# Patient Record
Sex: FEMALE | Race: WHITE | Employment: FULL TIME | URBAN - METROPOLITAN AREA
[De-identification: names, ages, dates, MRNs, and addresses within clinical notes are randomized per-mention and may not be internally consistent; named-entity substitution may affect disease eponyms.]

---

## 2023-03-13 LAB
ALANINE AMINOTRANSFERASE (SGPT) (U/L) IN SER/PLAS: 13 U/L (ref 7–45)
ALBUMIN (G/DL) IN SER/PLAS: 4.6 G/DL (ref 3.4–5)
ALKALINE PHOSPHATASE (U/L) IN SER/PLAS: 46 U/L (ref 33–110)
ANION GAP IN SER/PLAS: 12 MMOL/L (ref 10–20)
ASPARTATE AMINOTRANSFERASE (SGOT) (U/L) IN SER/PLAS: 15 U/L (ref 9–39)
BASOPHILS (10*3/UL) IN BLOOD BY AUTOMATED COUNT: 0.05 X10E9/L (ref 0–0.1)
BASOPHILS/100 LEUKOCYTES IN BLOOD BY AUTOMATED COUNT: 0.6 % (ref 0–2)
BILIRUBIN TOTAL (MG/DL) IN SER/PLAS: 0.8 MG/DL (ref 0–1.2)
C REACTIVE PROTEIN (MG/L) IN SER/PLAS: <0.1 MG/DL
CALCIUM (MG/DL) IN SER/PLAS: 9.7 MG/DL (ref 8.6–10.6)
CARBON DIOXIDE, TOTAL (MMOL/L) IN SER/PLAS: 28 MMOL/L (ref 21–32)
CHLORIDE (MMOL/L) IN SER/PLAS: 103 MMOL/L (ref 98–107)
CREATININE (MG/DL) IN SER/PLAS: 0.65 MG/DL (ref 0.5–1.05)
EOSINOPHILS (10*3/UL) IN BLOOD BY AUTOMATED COUNT: 0.09 X10E9/L (ref 0–0.7)
EOSINOPHILS/100 LEUKOCYTES IN BLOOD BY AUTOMATED COUNT: 1.1 % (ref 0–6)
ERYTHROCYTE DISTRIBUTION WIDTH (RATIO) BY AUTOMATED COUNT: 12.6 % (ref 11.5–14.5)
ERYTHROCYTE MEAN CORPUSCULAR HEMOGLOBIN CONCENTRATION (G/DL) BY AUTOMATED: 32.5 G/DL (ref 32–36)
ERYTHROCYTE MEAN CORPUSCULAR VOLUME (FL) BY AUTOMATED COUNT: 86 FL (ref 80–100)
ERYTHROCYTES (10*6/UL) IN BLOOD BY AUTOMATED COUNT: 4.85 X10E12/L (ref 4–5.2)
GFR FEMALE: >90 ML/MIN/1.73M2
GLUCOSE (MG/DL) IN SER/PLAS: 85 MG/DL (ref 74–99)
HEMATOCRIT (%) IN BLOOD BY AUTOMATED COUNT: 41.9 % (ref 36–46)
HEMOGLOBIN (G/DL) IN BLOOD: 13.6 G/DL (ref 12–16)
IMMATURE GRANULOCYTES/100 LEUKOCYTES IN BLOOD BY AUTOMATED COUNT: 0.2 % (ref 0–0.9)
LEUKOCYTES (10*3/UL) IN BLOOD BY AUTOMATED COUNT: 8 X10E9/L (ref 4.4–11.3)
LYMPHOCYTES (10*3/UL) IN BLOOD BY AUTOMATED COUNT: 2.54 X10E9/L (ref 1.2–4.8)
LYMPHOCYTES/100 LEUKOCYTES IN BLOOD BY AUTOMATED COUNT: 31.6 % (ref 13–44)
MONOCYTES (10*3/UL) IN BLOOD BY AUTOMATED COUNT: 0.79 X10E9/L (ref 0.1–1)
MONOCYTES/100 LEUKOCYTES IN BLOOD BY AUTOMATED COUNT: 9.8 % (ref 2–10)
NEUTROPHILS (10*3/UL) IN BLOOD BY AUTOMATED COUNT: 4.55 X10E9/L (ref 1.2–7.7)
NEUTROPHILS/100 LEUKOCYTES IN BLOOD BY AUTOMATED COUNT: 56.7 % (ref 40–80)
NRBC (PER 100 WBCS) BY AUTOMATED COUNT: 0 /100 WBC (ref 0–0)
PLATELETS (10*3/UL) IN BLOOD AUTOMATED COUNT: 293 X10E9/L (ref 150–450)
POTASSIUM (MMOL/L) IN SER/PLAS: 4.2 MMOL/L (ref 3.5–5.3)
PROTEIN TOTAL: 7.3 G/DL (ref 6.4–8.2)
SODIUM (MMOL/L) IN SER/PLAS: 139 MMOL/L (ref 136–145)
THYROTROPIN (MIU/L) IN SER/PLAS BY DETECTION LIMIT <= 0.05 MIU/L: 2.19 MIU/L (ref 0.44–3.98)
UREA NITROGEN (MG/DL) IN SER/PLAS: 11 MG/DL (ref 6–23)

## 2023-04-10 LAB
CALPROTECTIN, STOOL: 20 UG/G
HELICOBACTER PYLORI AG: NEGATIVE

## 2023-04-26 ENCOUNTER — APPOINTMENT (OUTPATIENT)
Dept: PRIMARY CARE | Facility: CLINIC | Age: 28
End: 2023-04-26
Payer: COMMERCIAL

## 2023-07-19 PROBLEM — D84.9 IMMUNOSUPPRESSED STATUS (MULTI): Status: ACTIVE | Noted: 2023-07-19

## 2023-07-19 RX ORDER — OMEPRAZOLE 40 MG/1
1 CAPSULE, DELAYED RELEASE ORAL DAILY
COMMUNITY
Start: 2022-08-24 | End: 2023-07-20 | Stop reason: WASHOUT

## 2023-07-20 ENCOUNTER — LAB (OUTPATIENT)
Dept: LAB | Facility: LAB | Age: 28
End: 2023-07-20
Payer: COMMERCIAL

## 2023-07-20 ENCOUNTER — OFFICE VISIT (OUTPATIENT)
Dept: PRIMARY CARE | Facility: CLINIC | Age: 28
End: 2023-07-20
Payer: COMMERCIAL

## 2023-07-20 VITALS
BODY MASS INDEX: 23.69 KG/M2 | HEART RATE: 82 BPM | DIASTOLIC BLOOD PRESSURE: 75 MMHG | SYSTOLIC BLOOD PRESSURE: 111 MMHG | WEIGHT: 129.5 LBS | TEMPERATURE: 98.4 F

## 2023-07-20 DIAGNOSIS — K50.919 CROHN'S DISEASE WITH COMPLICATION, UNSPECIFIED GASTROINTESTINAL TRACT LOCATION (MULTI): Primary | ICD-10-CM

## 2023-07-20 DIAGNOSIS — N92.0 MENORRHAGIA WITH REGULAR CYCLE: ICD-10-CM

## 2023-07-20 DIAGNOSIS — Z00.00 ENCOUNTER FOR ROUTINE ADULT HEALTH EXAMINATION WITHOUT ABNORMAL FINDINGS: ICD-10-CM

## 2023-07-20 DIAGNOSIS — M54.50 CHRONIC BILATERAL LOW BACK PAIN WITHOUT SCIATICA: ICD-10-CM

## 2023-07-20 DIAGNOSIS — Z12.83 SKIN EXAM FOR MALIGNANT NEOPLASM: ICD-10-CM

## 2023-07-20 DIAGNOSIS — G89.29 CHRONIC BILATERAL LOW BACK PAIN WITHOUT SCIATICA: ICD-10-CM

## 2023-07-20 DIAGNOSIS — D84.9 IMMUNOSUPPRESSED STATUS (MULTI): ICD-10-CM

## 2023-07-20 LAB
ALANINE AMINOTRANSFERASE (SGPT) (U/L) IN SER/PLAS: 24 U/L (ref 7–45)
ALBUMIN (G/DL) IN SER/PLAS: 4.4 G/DL (ref 3.4–5)
ALKALINE PHOSPHATASE (U/L) IN SER/PLAS: 49 U/L (ref 33–110)
ANION GAP IN SER/PLAS: 8 MMOL/L (ref 10–20)
ASPARTATE AMINOTRANSFERASE (SGOT) (U/L) IN SER/PLAS: 18 U/L (ref 9–39)
BASOPHILS (10*3/UL) IN BLOOD BY AUTOMATED COUNT: 0.05 X10E9/L (ref 0–0.1)
BASOPHILS/100 LEUKOCYTES IN BLOOD BY AUTOMATED COUNT: 0.6 % (ref 0–2)
BILIRUBIN TOTAL (MG/DL) IN SER/PLAS: 0.5 MG/DL (ref 0–1.2)
C REACTIVE PROTEIN (MG/L) IN SER/PLAS: <0.1 MG/DL
CALCIDIOL (25 OH VITAMIN D3) (NG/ML) IN SER/PLAS: 24 NG/ML
CALCIUM (MG/DL) IN SER/PLAS: 9.7 MG/DL (ref 8.6–10.6)
CARBON DIOXIDE, TOTAL (MMOL/L) IN SER/PLAS: 31 MMOL/L (ref 21–32)
CHLORIDE (MMOL/L) IN SER/PLAS: 104 MMOL/L (ref 98–107)
CHOLESTEROL (MG/DL) IN SER/PLAS: 141 MG/DL (ref 0–199)
CHOLESTEROL IN HDL (MG/DL) IN SER/PLAS: 73.8 MG/DL
CHOLESTEROL/HDL RATIO: 1.9
CREATININE (MG/DL) IN SER/PLAS: 0.59 MG/DL (ref 0.5–1.05)
EOSINOPHILS (10*3/UL) IN BLOOD BY AUTOMATED COUNT: 0.16 X10E9/L (ref 0–0.7)
EOSINOPHILS/100 LEUKOCYTES IN BLOOD BY AUTOMATED COUNT: 1.9 % (ref 0–6)
ERYTHROCYTE DISTRIBUTION WIDTH (RATIO) BY AUTOMATED COUNT: 12.7 % (ref 11.5–14.5)
ERYTHROCYTE MEAN CORPUSCULAR HEMOGLOBIN CONCENTRATION (G/DL) BY AUTOMATED: 32.3 G/DL (ref 32–36)
ERYTHROCYTE MEAN CORPUSCULAR VOLUME (FL) BY AUTOMATED COUNT: 88 FL (ref 80–100)
ERYTHROCYTES (10*6/UL) IN BLOOD BY AUTOMATED COUNT: 4.59 X10E12/L (ref 4–5.2)
ESTIMATED AVERAGE GLUCOSE FOR HBA1C: 88 MG/DL
FERRITIN (UG/LL) IN SER/PLAS: 18 UG/L (ref 8–150)
GFR FEMALE: >90 ML/MIN/1.73M2
GLUCOSE (MG/DL) IN SER/PLAS: 89 MG/DL (ref 74–99)
HEMATOCRIT (%) IN BLOOD BY AUTOMATED COUNT: 40.2 % (ref 36–46)
HEMOGLOBIN (G/DL) IN BLOOD: 13 G/DL (ref 12–16)
HEMOGLOBIN A1C/HEMOGLOBIN TOTAL IN BLOOD: 4.7 %
IMMATURE GRANULOCYTES/100 LEUKOCYTES IN BLOOD BY AUTOMATED COUNT: 0.1 % (ref 0–0.9)
IRON (UG/DL) IN SER/PLAS: 102 UG/DL (ref 35–150)
IRON BINDING CAPACITY (UG/DL) IN SER/PLAS: 409 UG/DL (ref 240–445)
IRON SATURATION (%) IN SER/PLAS: 25 % (ref 25–45)
LDL: 53 MG/DL (ref 0–99)
LEUKOCYTES (10*3/UL) IN BLOOD BY AUTOMATED COUNT: 8.4 X10E9/L (ref 4.4–11.3)
LYMPHOCYTES (10*3/UL) IN BLOOD BY AUTOMATED COUNT: 2.35 X10E9/L (ref 1.2–4.8)
LYMPHOCYTES/100 LEUKOCYTES IN BLOOD BY AUTOMATED COUNT: 28.1 % (ref 13–44)
MONOCYTES (10*3/UL) IN BLOOD BY AUTOMATED COUNT: 0.91 X10E9/L (ref 0.1–1)
MONOCYTES/100 LEUKOCYTES IN BLOOD BY AUTOMATED COUNT: 10.9 % (ref 2–10)
NEUTROPHILS (10*3/UL) IN BLOOD BY AUTOMATED COUNT: 4.89 X10E9/L (ref 1.2–7.7)
NEUTROPHILS/100 LEUKOCYTES IN BLOOD BY AUTOMATED COUNT: 58.4 % (ref 40–80)
NRBC (PER 100 WBCS) BY AUTOMATED COUNT: 0 /100 WBC (ref 0–0)
PLATELETS (10*3/UL) IN BLOOD AUTOMATED COUNT: 284 X10E9/L (ref 150–450)
POTASSIUM (MMOL/L) IN SER/PLAS: 4.4 MMOL/L (ref 3.5–5.3)
PROTEIN TOTAL: 7.2 G/DL (ref 6.4–8.2)
SEDIMENTATION RATE, ERYTHROCYTE: 7 MM/H (ref 0–20)
SODIUM (MMOL/L) IN SER/PLAS: 139 MMOL/L (ref 136–145)
THYROTROPIN (MIU/L) IN SER/PLAS BY DETECTION LIMIT <= 0.05 MIU/L: 4.58 MIU/L (ref 0.44–3.98)
THYROXINE (T4) FREE (NG/DL) IN SER/PLAS: 1.01 NG/DL (ref 0.78–1.48)
TRIGLYCERIDE (MG/DL) IN SER/PLAS: 69 MG/DL (ref 0–149)
UREA NITROGEN (MG/DL) IN SER/PLAS: 12 MG/DL (ref 6–23)
VLDL: 14 MG/DL (ref 0–40)

## 2023-07-20 PROCEDURE — 82306 VITAMIN D 25 HYDROXY: CPT

## 2023-07-20 PROCEDURE — 36415 COLL VENOUS BLD VENIPUNCTURE: CPT

## 2023-07-20 PROCEDURE — 85652 RBC SED RATE AUTOMATED: CPT

## 2023-07-20 PROCEDURE — 99214 OFFICE O/P EST MOD 30 MIN: CPT | Performed by: INTERNAL MEDICINE

## 2023-07-20 PROCEDURE — 83540 ASSAY OF IRON: CPT

## 2023-07-20 PROCEDURE — 80061 LIPID PANEL: CPT

## 2023-07-20 PROCEDURE — 84443 ASSAY THYROID STIM HORMONE: CPT

## 2023-07-20 PROCEDURE — 86140 C-REACTIVE PROTEIN: CPT

## 2023-07-20 PROCEDURE — 82728 ASSAY OF FERRITIN: CPT

## 2023-07-20 PROCEDURE — 83036 HEMOGLOBIN GLYCOSYLATED A1C: CPT

## 2023-07-20 PROCEDURE — 83550 IRON BINDING TEST: CPT

## 2023-07-20 PROCEDURE — 84439 ASSAY OF FREE THYROXINE: CPT

## 2023-07-20 PROCEDURE — 1036F TOBACCO NON-USER: CPT | Performed by: INTERNAL MEDICINE

## 2023-07-20 PROCEDURE — 85025 COMPLETE CBC W/AUTO DIFF WBC: CPT

## 2023-07-20 PROCEDURE — 80053 COMPREHEN METABOLIC PANEL: CPT

## 2023-07-20 RX ORDER — ESCITALOPRAM OXALATE 10 MG/1
10 TABLET ORAL DAILY
COMMUNITY
Start: 2023-06-01 | End: 2023-10-24 | Stop reason: SDUPTHER

## 2023-07-20 NOTE — PATIENT INSTRUCTIONS
It was a pleasure to see you today! Here is a list of things we have discussed and to follow up on:   I have ordered blood and/or urine tests for you to do today. The lab can be found on this floor (2nd floor) next to the pharmacy across from the elevators.    Stop by the first floor for your x-ray.   Try low impact aerobic exercises 5-10 minutes a day, try taking pepcid prior.   Followup with me in 3 months for your physical.

## 2023-07-20 NOTE — PROGRESS NOTES
Subjective   Patient ID: Leni Valles is a 28 y.o. female who presents for NEW PT VISIT .  HPI  28-year-old female new here for establishment of care, previously seen by Dr. Gomez last seen in February of last year.    -Seen by her gastroenterologist in March concern for chronic fatigue, ordered inflammatory markers labs and H. pylori testing were unremarkable.  Fecal calprotectin also negative. She notes chronic exhaustion that she needs to continue to keep sleeping. Denies shortness of breath, chest pain, presyncopal, dizziness. Extreme fatigue gets in the way of her life started in the last 2 weeks, though she has been chronically mildly tired. No recent job changes. When at job no impairment in job functioning. Does not believe her medications are contributing to her symptoms.   - Has been experiencing some bruising on legs and armst hat is new for her, does not usually bruise easily.   - When exercising had noted epigastric discomfort was given PPI which worsened her symptoms. Has a known hiatal hernia.     PMHx:  -Crohn's disease-diagnosed at age 13 on Entyvio infusions followed by gastroenterology Dr. Toussaint last seen in March.  Last EGD and colonoscopy 7/2022  -Possible mild peripheral spondyloarthropathy - gets back pain worse with menses, pain is mild in her lower back, no radiation of pain, described as a soreness sensation, at times feel stiffness. Gets some wrist and hand pains, someitmes her knees.   - Anxiety treated with Lexapro with psychiatrist - symptoms have improved but still present. Intermittent panic symptoms, no side effects on this medication. Has been on this medication since early May. Some mild symptoms of depression   - Menorrhagia - very heavy periods for the first 3 days needs to change a super tampon ever 45 minutes.     Social:   - No tobacco, social alcohol or drugs   - Works in [employer] at main campus, works night shift. 4 nights a week x 10 hours   - Lives at home with  boyfriend, safe at home.     Lifestyle   - Sleep - 6-8 hours no interruptions, does not feel refreshed when she wakes up, does not snore.     Objective     /75   Pulse 82   Temp 36.9 °C (98.4 °F)   Wt 58.7 kg (129 lb 8 oz)   BMI 23.69 kg/m²   Bp 106/71 heart rate 74   Physical Exam  General: Appears comfortable, NAD, appropriate affect  HEENT: NCAT, EOMI, pupils symmetric, no conjunctival erythema, no abnormal nasal mucosa appreciated.   Neck: Supple, no LAD   Heart: RRR S1 S2 no murmurs appreciated   Lungs: CTA bilaterally, no rhonchi, rales, or wheezes   Abdomen: Soft, NT/ND, no rebound or guarding, NABS   Extremities: no cyanosis or edema appreciated  Neuro: AAO x 3, answers questions appropriately, no FND, gait unremarkable,  Musk: no reproducible spinal tenderness, no reproducible paraspinal tenderness, demonstrates full ROM, strength intact, SLR test negative bilaterally       Assessment/Plan   Problem List Items Addressed This Visit       Crohn's disease (CMS/HCC) - Primary     On entyvio infusions, no evidence of recurrence followed by GI. Recheck inflammatory markers.          Immunosuppressed status (CMS/HCC)     Other Visit Diagnoses       Skin exam for malignant neoplasm        needs yearly derm eval    Relevant Orders    Referral to Dermatology    Encounter for routine adult health examination without abnormal findings        Relevant Orders    CBC and Auto Differential    Hemoglobin A1C    Comprehensive Metabolic Panel    Lipid Panel    TSH with reflex to Free T4 if abnormal    Vitamin D, Total    Follow Up In [facility] - PCP - Health Maintenance    Menorrhagia with regular cycle        Possible contributor to her symtpoms, will check for iron deficiency.    Relevant Orders    Iron and TIBC    Ferritin    Chronic bilateral low back pain without sciatica        No alarm features, suggestive of possible strain. Will check XR with SI joint views. consider PT referral.    Relevant  Orders    Sedimentation Rate    C-reactive protein    XR lumbar spine complete 4+ views          Health Maintenance  Cancer screening:   - Pap 1/2021  -Dermatology evaluation annually recommended    Followup 3 months

## 2023-07-24 DIAGNOSIS — E61.1 IRON DEFICIENCY: Primary | ICD-10-CM

## 2023-07-24 DIAGNOSIS — E03.8 SUBCLINICAL HYPOTHYROIDISM: ICD-10-CM

## 2023-09-26 PROBLEM — R19.7 DIARRHEA: Status: ACTIVE | Noted: 2023-09-26

## 2023-09-26 PROBLEM — R10.33 PERIUMBILICAL ABDOMINAL PAIN: Status: ACTIVE | Noted: 2023-09-26

## 2023-09-26 PROBLEM — G93.32 CHRONIC FATIGUE DISORDER: Status: ACTIVE | Noted: 2023-09-26

## 2023-09-26 RX ORDER — VIT C/E/ZN/COPPR/LUTEIN/ZEAXAN 250MG-90MG
3000 CAPSULE ORAL DAILY
COMMUNITY
Start: 2012-08-27 | End: 2024-04-25 | Stop reason: WASHOUT

## 2023-09-26 RX ORDER — MESALAMINE 500 MG/1
1000 CAPSULE, EXTENDED RELEASE ORAL 4 TIMES DAILY
COMMUNITY
Start: 2015-03-16 | End: 2024-04-25 | Stop reason: WASHOUT

## 2023-09-26 RX ORDER — TRETINOIN 0.5 MG/G
1 CREAM TOPICAL EVERY EVENING
COMMUNITY
Start: 2012-03-10 | End: 2024-04-25 | Stop reason: WASHOUT

## 2023-09-26 RX ORDER — OMEPRAZOLE 40 MG/1
40 CAPSULE, DELAYED RELEASE ORAL DAILY
COMMUNITY
End: 2024-04-25 | Stop reason: WASHOUT

## 2023-09-26 RX ORDER — METHOCARBAMOL 500 MG/1
1-2 TABLET, FILM COATED ORAL EVERY 12 HOURS PRN
COMMUNITY
Start: 2023-07-25 | End: 2024-04-25 | Stop reason: WASHOUT

## 2023-09-26 RX ORDER — FOLIC ACID 1 MG/1
1 TABLET ORAL DAILY
COMMUNITY
Start: 2016-11-25 | End: 2024-04-25 | Stop reason: WASHOUT

## 2023-09-26 RX ORDER — ONDANSETRON 4 MG/1
4 TABLET, ORALLY DISINTEGRATING ORAL AS NEEDED
COMMUNITY
Start: 2023-05-25 | End: 2024-04-25 | Stop reason: WASHOUT

## 2023-09-26 RX ORDER — CALCIUM CITRATE/VITAMIN D3 315MG-6.25
1 TABLET ORAL 2 TIMES DAILY
COMMUNITY
End: 2024-04-25 | Stop reason: WASHOUT

## 2023-09-26 RX ORDER — NAPROXEN 500 MG/1
500 TABLET ORAL EVERY 12 HOURS PRN
COMMUNITY
Start: 2023-07-25

## 2023-09-26 RX ORDER — MERCAPTOPURINE 50 MG/1
100 TABLET ORAL DAILY
COMMUNITY
Start: 2017-01-10 | End: 2024-04-25 | Stop reason: WASHOUT

## 2023-10-02 ENCOUNTER — OFFICE VISIT (OUTPATIENT)
Dept: GASTROENTEROLOGY | Facility: HOSPITAL | Age: 28
End: 2023-10-02
Payer: COMMERCIAL

## 2023-10-02 ENCOUNTER — LAB (OUTPATIENT)
Dept: LAB | Facility: LAB | Age: 28
End: 2023-10-02
Payer: COMMERCIAL

## 2023-10-02 VITALS
DIASTOLIC BLOOD PRESSURE: 85 MMHG | RESPIRATION RATE: 18 BRPM | HEART RATE: 70 BPM | SYSTOLIC BLOOD PRESSURE: 125 MMHG | TEMPERATURE: 98.1 F | WEIGHT: 131 LBS | BODY MASS INDEX: 24.11 KG/M2 | HEIGHT: 62 IN

## 2023-10-02 DIAGNOSIS — K50.019 CROHN'S DISEASE OF SMALL INTESTINE WITH COMPLICATION (MULTI): Primary | ICD-10-CM

## 2023-10-02 DIAGNOSIS — K50.019 CROHN'S DISEASE OF SMALL INTESTINE WITH COMPLICATION (MULTI): ICD-10-CM

## 2023-10-02 LAB
ALBUMIN SERPL BCP-MCNC: 4.6 G/DL (ref 3.4–5)
ALP SERPL-CCNC: 48 U/L (ref 33–110)
ALT SERPL W P-5'-P-CCNC: 12 U/L (ref 7–45)
ANION GAP SERPL CALC-SCNC: 10 MMOL/L (ref 10–20)
AST SERPL W P-5'-P-CCNC: 15 U/L (ref 9–39)
BASOPHILS # BLD AUTO: 0.03 X10*3/UL (ref 0–0.1)
BASOPHILS NFR BLD AUTO: 0.4 %
BILIRUB SERPL-MCNC: 0.8 MG/DL (ref 0–1.2)
BUN SERPL-MCNC: 10 MG/DL (ref 6–23)
CALCIUM SERPL-MCNC: 9.5 MG/DL (ref 8.6–10.6)
CHLORIDE SERPL-SCNC: 102 MMOL/L (ref 98–107)
CO2 SERPL-SCNC: 31 MMOL/L (ref 21–32)
CREAT SERPL-MCNC: 0.64 MG/DL (ref 0.5–1.05)
CRP SERPL-MCNC: <0.1 MG/DL
EOSINOPHIL # BLD AUTO: 0.09 X10*3/UL (ref 0–0.7)
EOSINOPHIL NFR BLD AUTO: 1.3 %
ERYTHROCYTE [DISTWIDTH] IN BLOOD BY AUTOMATED COUNT: 12.4 % (ref 11.5–14.5)
GFR SERPL CREATININE-BSD FRML MDRD: >90 ML/MIN/1.73M*2
GLUCOSE SERPL-MCNC: 79 MG/DL (ref 74–99)
HCT VFR BLD AUTO: 39.4 % (ref 36–46)
HGB BLD-MCNC: 12.9 G/DL (ref 12–16)
IMM GRANULOCYTES # BLD AUTO: 0.02 X10*3/UL (ref 0–0.7)
IMM GRANULOCYTES NFR BLD AUTO: 0.3 % (ref 0–0.9)
LYMPHOCYTES # BLD AUTO: 2.03 X10*3/UL (ref 1.2–4.8)
LYMPHOCYTES NFR BLD AUTO: 30.3 %
MCH RBC QN AUTO: 28 PG (ref 26–34)
MCHC RBC AUTO-ENTMCNC: 32.7 G/DL (ref 32–36)
MCV RBC AUTO: 86 FL (ref 80–100)
MONOCYTES # BLD AUTO: 0.65 X10*3/UL (ref 0.1–1)
MONOCYTES NFR BLD AUTO: 9.7 %
NEUTROPHILS # BLD AUTO: 3.88 X10*3/UL (ref 1.2–7.7)
NEUTROPHILS NFR BLD AUTO: 58 %
NRBC BLD-RTO: 0 /100 WBCS (ref 0–0)
PLATELET # BLD AUTO: 256 X10*3/UL (ref 150–450)
PMV BLD AUTO: 10.7 FL (ref 7.5–11.5)
POTASSIUM SERPL-SCNC: 4.2 MMOL/L (ref 3.5–5.3)
PROT SERPL-MCNC: 7.3 G/DL (ref 6.4–8.2)
RBC # BLD AUTO: 4.6 X10*6/UL (ref 4–5.2)
SODIUM SERPL-SCNC: 139 MMOL/L (ref 136–145)
WBC # BLD AUTO: 6.7 X10*3/UL (ref 4.4–11.3)

## 2023-10-02 PROCEDURE — 99214 OFFICE O/P EST MOD 30 MIN: CPT | Performed by: INTERNAL MEDICINE

## 2023-10-02 PROCEDURE — 36415 COLL VENOUS BLD VENIPUNCTURE: CPT

## 2023-10-02 PROCEDURE — 85025 COMPLETE CBC W/AUTO DIFF WBC: CPT

## 2023-10-02 PROCEDURE — 80053 COMPREHEN METABOLIC PANEL: CPT

## 2023-10-02 PROCEDURE — 86140 C-REACTIVE PROTEIN: CPT

## 2023-10-02 PROCEDURE — 1036F TOBACCO NON-USER: CPT | Performed by: INTERNAL MEDICINE

## 2023-10-02 RX ORDER — DICYCLOMINE HYDROCHLORIDE 10 MG/1
10 CAPSULE ORAL 3 TIMES DAILY PRN
Qty: 30 CAPSULE | Refills: 1 | Status: SHIPPED | OUTPATIENT
Start: 2023-10-02 | End: 2024-10-01

## 2023-10-02 RX ORDER — BUDESONIDE 3 MG/1
9 CAPSULE, COATED PELLETS ORAL DAILY
Qty: 90 CAPSULE | Refills: 1 | Status: SHIPPED | OUTPATIENT
Start: 2023-10-02 | End: 2024-04-25 | Stop reason: WASHOUT

## 2023-10-02 ASSESSMENT — LIFESTYLE VARIABLES
AUDIT-C TOTAL SCORE: 1
HOW MANY STANDARD DRINKS CONTAINING ALCOHOL DO YOU HAVE ON A TYPICAL DAY: 1 OR 2
HOW OFTEN DO YOU HAVE SIX OR MORE DRINKS ON ONE OCCASION: NEVER
SKIP TO QUESTIONS 9-10: 1
HOW OFTEN DO YOU HAVE A DRINK CONTAINING ALCOHOL: MONTHLY OR LESS

## 2023-10-02 ASSESSMENT — PAIN SCALES - GENERAL: PAINLEVEL: 3

## 2023-10-02 NOTE — PATIENT INSTRUCTIONS
Update labs: CBC, CMP, CRP, and fecal calprotectin.  Resume Entyvio as soon as possible.  Prescribe budesonide 9 mg daily.  Prescribe dicyclomine to take as needed for abdominal pain.  Follow-up in 3 months.

## 2023-10-02 NOTE — PROGRESS NOTES
Subjective     History of Present Illness:   Leni Valles is a 28 y.o. female who presents to GI clinic for Crohn's disease s/p ileocolectomy in 2012 who presented to GI clinic for follow-up.    Symptoms: She reports lower right abdominal pain, epigastric pain and back pain since Jul 2023, progressively worse in the last couple weeks, hurting her daily. Pain is worse with eating associated with bloating and nauseous.   Her appetite has been poor in the past month. No weight loss reported.   Her baseline is 1-2 formed to loose stools, now has 2-3 loose Bms, some nocturnal stools w/ urgency. Usually they are non-bloody, except for yesterday.     She is currently on Entyvio Q8 weeks, last infusion was in end of June 2023.   She missed an infusion in Aug 2023.  She has an upcoming appt with the [facility] on 10/24 for her Entyvio infusion.    IBD History  Patient was diagnosed with Crohn's disease at age 13. Her symptoms at the time were abdominal pain and constipation. She underwent EGD and colonoscopy in 2009 with Dr. Oconnell at [facility] which reportedly showed ileitis. Patient was initially placed on Pentasa. She later switched her care to Dr. Bansal at [facility] at age 15 and continued on Pentasa. 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 She underwent ileocolonic resection in February 2012 and reportedly had one foot of ileum resected. She resumed Pentasa and added mercaptopurine 75 mg daily post surgery.   She transitioned to adult GI at [facility] with Dr. Gee in 2020 and continued mercaptopurine and Pentasa. Patient had colonoscopy in February 2021 which reportedly showed minimal ileal erosions. Pathology showed patchy ileitis and normal colonic biopsies. MRE 8/18/21 showed no evidence of active CD. She experienced symptoms of weight loss, intermittent abdominal pain, and diarrhea in 2020. In November 2021, patient was placed on vedolizumab infusions.  Pentasa & mercaptopurine were discontinued in March 2022.   Patient established care with our clinic in May 2022. She complained of having diarrhea and abdominal pain with food intake. She underwent diagnostic EGD and colonoscopy on 7/8/22 which showed 1 cm hiatal hernia, normal hafsa-TI, and colon. Capsule endoscopy on 8/22/22 showed normal small bowel.     Review of Systems  Review of Systems  Constitutional: denies fever, chills, weight loss  HEENT: denies oral ulcers, dysphagia  Cardiovascular: denies chest pain  Respiratory: denies shortness of breath  GI: see HPI  Musculoskeletal: + back pain  Hem/Onc: denies easy bleeding or bruising  Dermatology: denies jaundice, rash      Past Medical History   has a past medical history of Other conditions influencing health status.   Crohn's disease    Social History   reports that she has never smoked. She has never used smokeless tobacco. She reports that she does not currently use alcohol after a past usage of about 1.0 standard drink of alcohol per week. She reports that she does not use drugs.     Family History  family history includes Breast cancer (age of onset: 40) in her mother; Hypertension in her father; Leukemia in her maternal grandfather; Lung cancer in her maternal grandmother; Ovarian cancer in her mother; Skin cancer in her father; Uterine cancer (age of onset: 58) in her mother.     Allergies  Allergies   Allergen Reactions    Lactose Unknown       Medications  Current Outpatient Medications   Medication Instructions    CALCIUM CITRATE ORAL oral, Daily, 2 gummies    calcium citrate-vitamin D3 (Citracal + D Maximum) 315 mg-6.25 mcg (250 unit) tablet 1 tablet, oral, 2 times daily, For 30 days    cholecalciferol (VITAMIN D-3) 3,000 Units, oral, Daily    escitalopram (LEXAPRO) 10 mg, oral, Daily    folic acid (FOLVITE) 1 mg, oral, Daily, For 30 days    LACTASE-RENNET ORAL 1 tablet, oral, 3 times daily with meals, lactase-rennet 25-2 mg ORAL tablet     mercaptopurine (PURINETHOL) 100 mg, oral, Daily    mesalamine (PENTASA) 1,000 mg, oral, 4 times daily, For 30 days    methocarbamol (Robaxin) 500 mg tablet 1-2 tablets, oral, Every 12 hours PRN, May cause sleepiness    multivitamin capsule 1 capsule, oral, Daily    naproxen (NAPROSYN) 500 mg, oral, Every 12 hours PRN    omeprazole (PRILOSEC) 40 mg, oral, Daily, Delayed Release capsule - Do not crush or chew.    ondansetron ODT (ZOFRAN-ODT) 4 mg, oral, As needed, 1-2x daily    tretinoin (Retin-A) 0.05 % cream 1 Application, Topical, Every evening, To face    vedolizumab (Entyvio) 300 mg injection intravenous    ZINC GLUCONATE ORAL 1 each, oral, Daily        Objective   Visit Vitals  /85   Pulse 70   Temp 36.7 °C (98.1 °F)   Resp 18      Physical Exam  Physical Exam  Vitals signs reviewed.    General: not in acute distress  CV: regular rate and rhythm, no murmur  Resp: clear to auscultation bilaterally  GI: soft, active bowel sounds, + epigastric tenderness to palpation, no rebound or guarding, no ascites  Msk: no lower extremity edema  Derm: no jaundice     Labs  From 07/20/2023    CBC: Hb 13.0, otherwise normal  ESR 7  CRP < 0.10  Ferritin 18  Iron 102, TIBC 409, saturation 25%  CMP normal      Assessment/Plan   Ms. Valles is a pleasant 28 year-old female with Crohn's disease s/p ileocolectomy in 2012 who presented to GI clinic for follow-up. Patient reports having abdominal pain, decrease appetite and bloating x 3 months in the setting of missing her last Entyvio Aug 2023.  These symptoms may be due to Crohn's disease flare.     Crohn's disease:  Age at diagnosis: 13   Disease Extent: ileal disease  Disease Behavior: stricturing  Current symptoms: mild epigastric pain with food intake that may be due to dyspepsia/PUD  Prior Medications: 6-MP 75 mg daily, Pentasa  Active Medications: vedolizumab every 8 weeks  Last endoscopy: EGD and colonoscopy on 7/8/22 showed 1 cm hiatal hernia, normal hafsa-TI, and colon.  Capsule endoscopy on 8/22/22 showed normal small bowel  Last imaging studies: MRE on 8/18/21 showed no evidence of active CD  EIMs: possible mild peripheral spondyloarthropathy in wrist and knees.     Plan   Patient should resume Entyvio as soon as possible, currently scheduled for 10/24.   Update labs: CBC, CMP, CRP, and fecal calprotectin.  Prescribe budesonide 9 mg daily for a month  Dicyclomine as needed for abdominal pain  If her symptoms do not improve within a week of resuming Entyvio, she will call our office.   Follow-up in 3 months      Marlen Giron MD       I have examined the patient and reviewed the trainee's note.  I agree with the following documentation with edits to reflect my assessment.  Additional comments below as needed.    Gordon Toussaint MD

## 2023-10-10 ENCOUNTER — LAB (OUTPATIENT)
Dept: LAB | Facility: LAB | Age: 28
End: 2023-10-10
Payer: COMMERCIAL

## 2023-10-10 DIAGNOSIS — E03.8 SUBCLINICAL HYPOTHYROIDISM: ICD-10-CM

## 2023-10-10 DIAGNOSIS — E61.1 IRON DEFICIENCY: ICD-10-CM

## 2023-10-10 LAB — TSH SERPL-ACNC: 3.77 MIU/L (ref 0.44–3.98)

## 2023-10-10 PROCEDURE — 84443 ASSAY THYROID STIM HORMONE: CPT

## 2023-10-10 PROCEDURE — 36415 COLL VENOUS BLD VENIPUNCTURE: CPT

## 2023-10-17 DIAGNOSIS — K50.019 CROHN'S DISEASE OF SMALL INTESTINE WITH COMPLICATION (MULTI): Primary | ICD-10-CM

## 2023-10-17 RX ORDER — DIPHENHYDRAMINE HYDROCHLORIDE 50 MG/ML
50 INJECTION INTRAMUSCULAR; INTRAVENOUS AS NEEDED
Status: CANCELLED | OUTPATIENT
Start: 2023-10-24

## 2023-10-17 RX ORDER — EPINEPHRINE 0.3 MG/.3ML
0.3 INJECTION SUBCUTANEOUS EVERY 5 MIN PRN
Status: CANCELLED | OUTPATIENT
Start: 2023-10-24

## 2023-10-17 RX ORDER — ALBUTEROL SULFATE 0.83 MG/ML
3 SOLUTION RESPIRATORY (INHALATION) AS NEEDED
Status: CANCELLED | OUTPATIENT
Start: 2023-10-24

## 2023-10-17 RX ORDER — FAMOTIDINE 10 MG/ML
20 INJECTION INTRAVENOUS ONCE AS NEEDED
Status: CANCELLED | OUTPATIENT
Start: 2023-10-24

## 2023-10-24 ENCOUNTER — OFFICE VISIT (OUTPATIENT)
Dept: PRIMARY CARE | Facility: CLINIC | Age: 28
End: 2023-10-24
Payer: COMMERCIAL

## 2023-10-24 ENCOUNTER — INFUSION (OUTPATIENT)
Dept: INFUSION THERAPY | Facility: CLINIC | Age: 28
End: 2023-10-24
Payer: COMMERCIAL

## 2023-10-24 VITALS
SYSTOLIC BLOOD PRESSURE: 110 MMHG | TEMPERATURE: 97.5 F | HEART RATE: 83 BPM | OXYGEN SATURATION: 97 % | DIASTOLIC BLOOD PRESSURE: 74 MMHG | BODY MASS INDEX: 23.78 KG/M2 | WEIGHT: 130 LBS

## 2023-10-24 VITALS
BODY MASS INDEX: 24.03 KG/M2 | WEIGHT: 131.39 LBS | OXYGEN SATURATION: 99 % | HEART RATE: 60 BPM | SYSTOLIC BLOOD PRESSURE: 105 MMHG | RESPIRATION RATE: 16 BRPM | TEMPERATURE: 98.5 F | DIASTOLIC BLOOD PRESSURE: 72 MMHG

## 2023-10-24 DIAGNOSIS — D84.9 IMMUNOSUPPRESSED STATUS (MULTI): ICD-10-CM

## 2023-10-24 DIAGNOSIS — Z01.419 WELL WOMAN EXAM: ICD-10-CM

## 2023-10-24 DIAGNOSIS — K50.919 CROHN'S DISEASE WITH COMPLICATION, UNSPECIFIED GASTROINTESTINAL TRACT LOCATION (MULTI): ICD-10-CM

## 2023-10-24 DIAGNOSIS — F41.9 ANXIETY: ICD-10-CM

## 2023-10-24 DIAGNOSIS — K50.019 CROHN'S DISEASE OF SMALL INTESTINE WITH COMPLICATION (MULTI): ICD-10-CM

## 2023-10-24 DIAGNOSIS — G93.32 CHRONIC FATIGUE DISORDER: Primary | ICD-10-CM

## 2023-10-24 PROCEDURE — 1036F TOBACCO NON-USER: CPT | Performed by: STUDENT IN AN ORGANIZED HEALTH CARE EDUCATION/TRAINING PROGRAM

## 2023-10-24 PROCEDURE — 90471 IMMUNIZATION ADMIN: CPT | Performed by: STUDENT IN AN ORGANIZED HEALTH CARE EDUCATION/TRAINING PROGRAM

## 2023-10-24 PROCEDURE — 99213 OFFICE O/P EST LOW 20 MIN: CPT | Performed by: STUDENT IN AN ORGANIZED HEALTH CARE EDUCATION/TRAINING PROGRAM

## 2023-10-24 PROCEDURE — 90682 RIV4 VACC RECOMBINANT DNA IM: CPT | Performed by: STUDENT IN AN ORGANIZED HEALTH CARE EDUCATION/TRAINING PROGRAM

## 2023-10-24 PROCEDURE — 96365 THER/PROPH/DIAG IV INF INIT: CPT | Performed by: NURSE PRACTITIONER

## 2023-10-24 RX ORDER — EPINEPHRINE 0.3 MG/.3ML
0.3 INJECTION SUBCUTANEOUS EVERY 5 MIN PRN
Status: CANCELLED | OUTPATIENT
Start: 2023-12-19

## 2023-10-24 RX ORDER — ESCITALOPRAM OXALATE 10 MG/1
15 TABLET ORAL DAILY
Qty: 135 TABLET | Refills: 3 | Status: SHIPPED | OUTPATIENT
Start: 2023-10-24

## 2023-10-24 RX ORDER — DIPHENHYDRAMINE HYDROCHLORIDE 50 MG/ML
50 INJECTION INTRAMUSCULAR; INTRAVENOUS AS NEEDED
Status: CANCELLED | OUTPATIENT
Start: 2023-12-19

## 2023-10-24 RX ORDER — FAMOTIDINE 10 MG/ML
20 INJECTION INTRAVENOUS ONCE AS NEEDED
Status: CANCELLED | OUTPATIENT
Start: 2023-12-19

## 2023-10-24 RX ORDER — ALBUTEROL SULFATE 0.83 MG/ML
3 SOLUTION RESPIRATORY (INHALATION) AS NEEDED
Status: CANCELLED | OUTPATIENT
Start: 2023-12-19

## 2023-10-24 ASSESSMENT — ENCOUNTER SYMPTOMS
RECTAL PAIN: 0
BLOOD IN STOOL: 0
HOT FLASHES: 0
NECK PAIN: 0
VOMITING: 0
DEPRESSION: 0
BRUISES/BLEEDS EASILY: 0
SHORTNESS OF BREATH: 0
SLEEP DISTURBANCE: 0
CONFUSION: 0
FEVER: 0
DIZZINESS: 0
NUMBNESS: 0
CONSTIPATION: 0
APPETITE CHANGE: 0
SCLERAL ICTERUS: 0
ADENOPATHY: 0
NECK STIFFNESS: 0
NERVOUS/ANXIOUS: 0
COUGH: 0
HEADACHES: 0
PALPITATIONS: 0
CHILLS: 0
FATIGUE: 0
DYSURIA: 0
EXTREMITY WEAKNESS: 0
EYE PROBLEMS: 0
LIGHT-HEADEDNESS: 0
ARTHRALGIAS: 0
BACK PAIN: 0
LEG SWELLING: 0
UNEXPECTED WEIGHT CHANGE: 0
DIAPHORESIS: 0
VOICE CHANGE: 0
SEIZURES: 0
MYALGIAS: 0
WHEEZING: 0
ABDOMINAL PAIN: 0
DECREASED CONCENTRATION: 0
ABDOMINAL DISTENTION: 0
HEMATURIA: 0
TROUBLE SWALLOWING: 0
FLANK PAIN: 0
HEMOPTYSIS: 0
SORE THROAT: 0
CHEST TIGHTNESS: 0
WOUND: 0
NAUSEA: 0
DIARRHEA: 0
FREQUENCY: 0
DIFFICULTY URINATING: 0
SPEECH DIFFICULTY: 0

## 2023-10-24 NOTE — PATIENT INSTRUCTIONS
Today you received: ENTYVIO 300MG Q8 WEEKS     For:   1. Crohn's disease of small intestine with complication (CMS/[facility])          Please read the  Medication Guide that was given to you and reviewed during todays visit.     (Tell all doctors including dentists that you are taking this medication)     Go to the emergency room or call 911 if:  -You have signs of allergic reaction:   o         Rash, hives, itching.   o         Swollen, blistered, peeling skin.   o         Swelling of face, lips, mouth, tongue or throat.   o         Tightness of chest, trouble breathing, swallowing or talking      Call your doctor:     - If IV / injection site gets red, warm, swollen, itchy or leaks fluid or pus.     (Leave dressing on your IV site for at least 2 hours and keep area clean and dry  - If you get sick or have symptoms of infection or are not feeling well for any reason.    (Wash your hands often, stay away from people who are sick)  - If you have side effects from your medication that do not go away or are bothersome.     (Refer to the teaching your nurse gave you for side effects to call your doctor about)     Common side effects may include:  stuffy nose, headache, feeling tired, muscle aches, upset stomach  - Before receiving any vaccines, Call the [facility] at   if:  - You get sick, are on antibiotics, have had a recent vaccine, have surgery or dental work and your doctor wants your visit rescheduled.  - You need to cancel and reschedule your visit for any reason. Call at least 2 days before your visit if you need to cancel.   - Your insurance changes before your next visit.    (We will need to get approval from your new insurance. This can take up to two weeks.)     The [facility] is opened Monday thru Friday. We are closed on weekends and holidays.     Voice mail will take your call if the center is closed. If you leave a message please allow 24 hours for a call back during weekdays.  If you leave a message on a weekend/holiday, we will call you back the next business day.

## 2023-10-24 NOTE — PROGRESS NOTES
Subjective   Patient ID: Leni Valles is a 28 y.o. female who presents for No chief complaint on file..    HPI   Patient normally sees Dr. Sams  in the practice, in my schedule today however for scheduling reasons     Re: anxiety - was getting through psych provider, requesting SSRI through  PCP moving forward. Will renew. Well managed.    Re: IBD - on Entivyo. Infusion later today. Recent flare up presents as diarrhea but nothing severe enough for hospitalization.    Re: energy levels - remain on the low side. She has a hard time getting up and motivated and starting her day without caffeine. Feels decent rested after a night's sleep.     Re:  - requesting GYN referral for Pap smear (due next 1/24). Due for flu shot.       Review of Systems    Objective   /74   Pulse 83   Temp 36.4 °C (97.5 °F)   Wt 59 kg (130 lb)   SpO2 97%   BMI 23.78 kg/m²     Physical Exam  Gen: well developed in NAD. AAO x3.  HEENT: NC/AT. Anicteric sclera, symmetric pupils. MMM no thrush.  Neck: Soft, supple. No LAD. No goiter.   CV: RRR nl s1s2 no m/r/g  Pulm: CTAB no w/r/r, good air exchange  GI: soft NTND BS+ no hsm  Ext: WWP no edema  Neuro: II-XII grossly intact, nonfocal systemic findings  MSK: 5/5 strength b/l UE and LE  Gait: unremarkable    Lab Results   Component Value Date    WBC 6.7 10/02/2023    HGB 12.9 10/02/2023    HCT 39.4 10/02/2023     10/02/2023    CHOL 141 07/20/2023    TRIG 69 07/20/2023    HDL 73.8 07/20/2023    ALT 12 10/02/2023    AST 15 10/02/2023     10/02/2023    K 4.2 10/02/2023     10/02/2023    CREATININE 0.64 10/02/2023    BUN 10 10/02/2023    CO2 31 10/02/2023    TSH 3.77 10/10/2023    HGBA1C 4.7 07/20/2023     par     Assessment/Plan   Doing very well    # Anxiety  - renew SSRI  - follow psychologist    # Fatigue: labs reassuring  - continue current treatment  - recommend more sleep at night    # IBD: controlled  - follow up GI  - continue Entivyo    # HM  - flu shot  today (flublok)  - GYN referral  - follow up Dr. Sams

## 2023-10-24 NOTE — PROGRESS NOTES
[facility]   infusion Clinic Note   Date: 2023   Name: Leni Valles  : 1995   MRN: [id number]         Reason for Visit: OP Infusion (ENTYVIO 300MG [id number])       Visit Type: INFUSION     Ordered By: DR QUEEN   Accompanied by:Parent      Diagnosis: Crohn's disease of small intestine with complication (CMS/HCC)      Allergies:   Allergies as of 10/24/2023 - Reviewed 10/24/2023   Allergen Reaction Noted    Lactose Unknown 2017        Current Medications has a current medication list which includes the following prescription(s): budesonide ec, calcium citrate, calcium citrate-vitamin d3, cholecalciferol, dicyclomine, escitalopram, folic acid, lactase/rennet, mercaptopurine, mesalamine, methocarbamol, multivitamin, naproxen, omeprazole, ondansetron odt, tretinoin, vedolizumab, and zinc gluconate.          Vitals:  Vitals:    10/24/23 1055 10/24/23 1120 10/24/23 1150   BP: 104/66 105/73 105/72   Pulse: 66 61 60   Resp: 16 16 16   Temp: 36.7 °C (98 °F) 36.7 °C (98.1 °F) 36.9 °C (98.5 °F)   SpO2: 99% 99% 99%   Weight: 59.6 kg (131 lb 6.3 oz)            Infusion Pre-procedure Checklist:   Allergies reviewed: yes   Medications reviewed: yes     Previous reaction to current treatment: No      Assess patient for the concerns below. Document provider notification as appropriate.  - Active or recent infection with/without current antibiotic use: no  - Recent or planned invasive dental work: no  - Recent or planned surgeries: no  - Recently received or plans to receive vaccinations: no  - Has treatment related toxicities: no  - Is pregnant:  no    - Does the patient meet criteria to treat? Yes    Provider notified? Not applicable      Pain: 0    Is the pain different from normal: n/a   Is the pain tolerable: n/a   Is your Doctor aware: n/a       Labs: None      Fall Risk Screening: Susy Fall Risk  History of Falling, Immediate or Within 3 Months: No  Secondary Diagnosis:  No  Ambulatory Aid: Walks without aid/bedrest/nurse assist  Intravenous Therapy/Heparin Lock: No  Gait/Transferring: Normal/bedrest/immobile  Mental Status: Oriented to own ability  Jain Fall Risk Score: 0       Review of Systems   Constitutional:  Negative for appetite change, chills, diaphoresis, fatigue, fever and unexpected weight change.   HENT:   Negative for hearing loss, lump/mass, mouth sores, nosebleeds, sore throat, tinnitus, trouble swallowing and voice change.    Eyes:  Negative for eye problems and icterus.   Respiratory:  Negative for chest tightness, cough, hemoptysis, shortness of breath and wheezing.    Cardiovascular:  Negative for chest pain, leg swelling and palpitations.   Gastrointestinal:  Negative for abdominal distention, abdominal pain, blood in stool, constipation, diarrhea, nausea, rectal pain and vomiting.        PT WITH A DX OF IBD REPORTS 2-3 LOOSE BM'S PER DAY. ADMITS NOTING BLOOD/MUCUS TO STOOLS. ADMITS C/O OF ABDOMINAL PAIN AND NOCTURNAL STOOLING.    Endocrine: Negative for hot flashes.   Genitourinary:  Negative for bladder incontinence, difficulty urinating, dyspareunia, dysuria, frequency, hematuria, menstrual problem, nocturia, pelvic pain, vaginal bleeding and vaginal discharge.    Musculoskeletal:  Negative for arthralgias, back pain, flank pain, gait problem, myalgias, neck pain and neck stiffness.   Skin:  Negative for itching, rash and wound.   Neurological:  Negative for dizziness, extremity weakness, gait problem, headaches, light-headedness, numbness, seizures and speech difficulty.   Hematological:  Negative for adenopathy. Does not bruise/bleed easily.   Psychiatric/Behavioral:  Negative for confusion, decreased concentration, depression, sleep disturbance and suicidal ideas. The patient is not nervous/anxious.          Infusion Readiness:   Assessment Concerns Related to Infusion: No  Provider notified: n/a      Document Below Only If Indicated:   New Patient  Education:  N/A (returning patient for continuation of therapy. Ongoing education provided as needed.)       Drug Specific Questions:  Vedolizumab  (ENTYVIO)  ANY S/S (PML) MEMORY LOSS, TROUBLE THINKING, DIZZINESS OR LOSS OF BALANCE, DIFFICULTY TALKING OR WALKING, LOSS OF VISION? PT DENIES ABOVE      Weight Based Drug Calculations:  WEIGHT BASED DRUGS: NOT APPLICABLE           Initiated By: DHARA ESCALANTE RN    Time: 12:26 PM     We administered vedolizumab (Entyvio) 300 mg in sodium chloride 0.9% 250 mL IV.        _________________________________________________________________________    Note authored and patient cared for by: DHARA ESCALANTE RN    Note/Encounter reviewed by: Heather GORDON NP. This provider on site at time of patient infusion. Infusion staff to notify this provider of any questions, concerns, abnormals or issues during infusion.     Final check of medication completed by this EVAN / or on-site pharmacist with administering nurse using positive identification prior to administration. Final appearance of product checked for accuracy and conformity to the formula of the prepared product. Assured use of correct ingredients, accurate calculations and precise measurements under appropriate conditions and procedures.    No issues reported during today's encounter. Pt. tolerated infusion without difficulty. Pt. not independently evaluated by this provider during today's encounter.  (Heather GORDON NP)

## 2023-10-24 NOTE — PATIENT INSTRUCTIONS
Your blood work is up to date; no need for additional draw at this appointment      I have renewed your chronic medications today     I have referred you to a gynecologist for pap smears and well woman exams: [phone] to schedule.    Follow up with your specialists and Dr. Sams as previously scheduled.     You received your flu shot today!

## 2023-12-19 ENCOUNTER — INFUSION (OUTPATIENT)
Dept: INFUSION THERAPY | Facility: CLINIC | Age: 28
End: 2023-12-19
Payer: COMMERCIAL

## 2023-12-19 VITALS
DIASTOLIC BLOOD PRESSURE: 66 MMHG | RESPIRATION RATE: 16 BRPM | TEMPERATURE: 97.7 F | HEART RATE: 65 BPM | OXYGEN SATURATION: 99 % | SYSTOLIC BLOOD PRESSURE: 99 MMHG

## 2023-12-19 DIAGNOSIS — K50.019 CROHN'S DISEASE OF SMALL INTESTINE WITH COMPLICATION (MULTI): ICD-10-CM

## 2023-12-19 PROCEDURE — 96365 THER/PROPH/DIAG IV INF INIT: CPT | Performed by: NURSE PRACTITIONER

## 2023-12-19 RX ORDER — DIPHENHYDRAMINE HYDROCHLORIDE 50 MG/ML
50 INJECTION INTRAMUSCULAR; INTRAVENOUS AS NEEDED
Status: CANCELLED | OUTPATIENT
Start: 2024-02-13

## 2023-12-19 RX ORDER — EPINEPHRINE 0.3 MG/.3ML
0.3 INJECTION SUBCUTANEOUS EVERY 5 MIN PRN
Status: CANCELLED | OUTPATIENT
Start: 2024-02-13

## 2023-12-19 RX ORDER — FAMOTIDINE 10 MG/ML
20 INJECTION INTRAVENOUS ONCE AS NEEDED
Status: CANCELLED | OUTPATIENT
Start: 2024-02-13

## 2023-12-19 RX ORDER — ALBUTEROL SULFATE 0.83 MG/ML
3 SOLUTION RESPIRATORY (INHALATION) AS NEEDED
Status: CANCELLED | OUTPATIENT
Start: 2024-02-13

## 2023-12-19 ASSESSMENT — ENCOUNTER SYMPTOMS
DIARRHEA: 0
COUGH: 0
FREQUENCY: 0
WHEEZING: 0
BLOOD IN STOOL: 0
EXTREMITY WEAKNESS: 0
DIFFICULTY URINATING: 0
DIAPHORESIS: 0
FEVER: 0
FATIGUE: 0
CHEST TIGHTNESS: 0
VOICE CHANGE: 0
HEADACHES: 0
VOMITING: 0
LEG SWELLING: 0
LIGHT-HEADEDNESS: 0
PALPITATIONS: 0
CONSTIPATION: 0
NAUSEA: 0
DYSURIA: 0
NERVOUS/ANXIOUS: 0
ADENOPATHY: 0
APPETITE CHANGE: 0
UNEXPECTED WEIGHT CHANGE: 0
DIZZINESS: 0
SCLERAL ICTERUS: 0
BRUISES/BLEEDS EASILY: 0
NUMBNESS: 0
ABDOMINAL PAIN: 0
DEPRESSION: 0
HOT FLASHES: 0
EYE PROBLEMS: 0
HEMATURIA: 0
CHILLS: 0
TROUBLE SWALLOWING: 0
WOUND: 0
ABDOMINAL DISTENTION: 0
SHORTNESS OF BREATH: 1

## 2023-12-19 NOTE — PATIENT INSTRUCTIONS
Today :We administered vedolizumab (Entyvio) 300 mg in sodium chloride 0.9% 250 mL IV.     For:   1. Crohn's disease of small intestine with complication (CMS/HCC)         Your next appointment is due in: 8 WEEKS       Please read the  Medication Guide that was given to you and reviewed during todays visit.     (Tell all doctors including dentists that you are taking this medication)     Go to the emergency room or call 911 if:  -You have signs of allergic reaction:   -Rash, hives, itching.   -Swollen, blistered, peeling skin.   -Swelling of face, lips, mouth, tongue or throat.   -Tightness of chest, trouble breathing, swallowing or talking     Call your doctor:  - If IV / injection site gets red, warm, swollen, itchy or leaks fluid or pus.     (Leave dressing on your IV site for at least 2 hours and keep area clean and dry  - If you get sick or have symptoms of infection or are not feeling well for any reason.    (Wash your hands often, stay away from people who are sick)  - If you have side effects from your medication that do not go away or are bothersome.     (Refer to the teaching your nurse gave you for side effects to call your doctor about)    - Common side effects may include:  stuffy nose, headache, feeling tired, muscle aches, upset stomach  - Before receiving any vaccines     - Call the [facility] at   If:  - You get sick, are on antibiotics, have had a recent vaccine, have surgery or dental work and your doctor wants your visit rescheduled.  - You need to cancel and reschedule your visit for any reason. Call at least 2 days before your visit if you need to cancel.   - Your insurance changes before your next visit.    (We will need to get approval from your new insurance. This can take up to two weeks.)     [facility] is opened Monday thru Friday. We are closed on weekends and holidays.   Voice mail will take your call if the center is closed. If you leave a message  please allow 24 hours for a call back during weekdays. If you leave a message on a weekend/holiday, we will call you back the next business day.

## 2023-12-19 NOTE — PROGRESS NOTES
[facility]   infusion Clinic Note   Date: 2023   Name: Leni Valles  : 1995   MRN: [id number]         Reason for Visit: OP Infusion (PT HERE FOR ENTYVIO 300MG /NEXT APPT: 8 WEEKS )       Visit Type: INFUSION     Ordered By: DR QUEEN   Accompanied by:Parent      Diagnosis: Crohn's disease of small intestine with complication (CMS/HCC)      Allergies:   Allergies as of 2023 - Reviewed 2023   Allergen Reaction Noted    Lactose Unknown 2017        Current Medications has a current medication list which includes the following prescription(s): budesonide ec, calcium citrate, calcium citrate-vitamin d3, cholecalciferol, dicyclomine, escitalopram, folic acid, lactase/rennet, mercaptopurine, mesalamine, methocarbamol, multivitamin, naproxen, omeprazole, ondansetron odt, tretinoin, vedolizumab, and zinc gluconate.          Vitals:  Vitals:    23 1145 23 1204 23 1218   BP: 106/71 100/68 99/66   Pulse: 72 60 65   Resp: 16 16 16   Temp: 37 °C (98.6 °F) 36.5 °C (97.7 °F) 36.5 °C (97.7 °F)   SpO2: 97% 98% 99%          Infusion Pre-procedure Checklist:   Allergies reviewed: yes   Medications reviewed: yes     Previous reaction to current treatment: No      Assess patient for the concerns below. Document provider notification as appropriate.  - Active or recent infection with/without current antibiotic use: no  - Recent or planned invasive dental work: no  - Recent or planned surgeries: no  - Recently received or plans to receive vaccinations: no  - Has treatment related toxicities: no  - Is pregnant:  no    - Does the patient meet criteria to treat? Yes    Provider notified? Not applicable      Pain: 0    Is the pain different from normal: n/a   Is the pain tolerable: n/a   Is your Doctor aware: n/a       Labs: None      Fall Risk Screening: Susy Fall Risk  History of Falling, Immediate or Within 3 Months: No  Secondary Diagnosis: No  Ambulatory  Aid: Walks without aid/bedrest/nurse assist  Intravenous Therapy/Heparin Lock: No  Gait/Transferring: Normal/bedrest/immobile  Mental Status: Oriented to own ability  Jain Fall Risk Score: 0       Review of Systems   Constitutional:  Negative for appetite change, chills, diaphoresis, fatigue, fever and unexpected weight change.   HENT:   Negative for hearing loss, lump/mass, nosebleeds, trouble swallowing and voice change.    Eyes:  Negative for eye problems and icterus.   Respiratory:  Positive for shortness of breath. Negative for chest tightness, cough and wheezing.         D/T RECENT URI   Cardiovascular:  Negative for chest pain, leg swelling and palpitations.   Gastrointestinal:  Negative for abdominal distention, abdominal pain, blood in stool, constipation, diarrhea, nausea and vomiting.        PT WITH A DX OF IBD REPORTS 2-3 LOOSE BM'S PER DAY. ADMITS NOTING BLOOD/MUCUS TO STOOLS. ADMITS C/O OF ABDOMINAL PAIN AND NOCTURNAL STOOLING.    Endocrine: Negative for hot flashes.   Genitourinary:  Negative for bladder incontinence, difficulty urinating, dyspareunia, dysuria, frequency, hematuria and nocturia.    Skin:  Negative for itching, rash and wound.   Neurological:  Negative for dizziness, extremity weakness, headaches, light-headedness and numbness.   Hematological:  Negative for adenopathy. Does not bruise/bleed easily.   Psychiatric/Behavioral:  Negative for depression. The patient is not nervous/anxious.          Infusion Readiness:   Assessment Concerns Related to Infusion: No  Provider notified: n/a      Document Below Only If Indicated:   New Patient Education:  N/A (returning patient for continuation of therapy. Ongoing education provided as needed.)       Drug Specific Questions:  Vedolizumab  (ENTYVIO)  ANY S/S (PML) MEMORY LOSS, TROUBLE THINKING, DIZZINESS OR LOSS OF BALANCE, DIFFICULTY TALKING OR WALKING, LOSS OF VISION? PT DENIES ABOVE      Weight Based Drug Calculations:  WEIGHT BASED DRUGS:  NOT APPLICABLE           Initiated By: DHARA ESCALANTE RN    Time: 1:23 PM     We administered vedolizumab (Entyvio) 300 mg in sodium chloride 0.9% 250 mL IV.        _________________________________________________________________________    Note authored and patient cared for by: DHARA ESCALANTE RN    Note/Encounter reviewed by: Heather GORDON NP. This provider on site at time of patient infusion. Infusion staff to notify this provider of any questions, concerns, abnormals or issues during infusion.     Final check of medication completed by this EVAN / or on-site pharmacist with administering nurse using positive identification prior to administration. Final appearance of product checked for accuracy and conformity to the formula of the prepared product. Assured use of correct ingredients, accurate calculations and precise measurements under appropriate conditions and procedures.    No issues reported during today's encounter. Pt. tolerated infusion without difficulty. Pt. not independently evaluated by this provider during today's encounter.  (Heather Faulkner EVAN NP)

## 2023-12-29 ENCOUNTER — HOSPITAL ENCOUNTER (EMERGENCY)
Facility: HOSPITAL | Age: 28
Discharge: HOME | End: 2023-12-29
Payer: COMMERCIAL

## 2023-12-29 VITALS
TEMPERATURE: 97.1 F | RESPIRATION RATE: 16 BRPM | SYSTOLIC BLOOD PRESSURE: 117 MMHG | HEART RATE: 92 BPM | BODY MASS INDEX: 23.92 KG/M2 | HEIGHT: 62 IN | WEIGHT: 130 LBS | DIASTOLIC BLOOD PRESSURE: 79 MMHG | OXYGEN SATURATION: 98 %

## 2023-12-29 DIAGNOSIS — H57.11 ACUTE RIGHT EYE PAIN: ICD-10-CM

## 2023-12-29 DIAGNOSIS — S05.01XA ABRASION OF RIGHT CORNEA, INITIAL ENCOUNTER: Primary | ICD-10-CM

## 2023-12-29 PROCEDURE — 99283 EMERGENCY DEPT VISIT LOW MDM: CPT

## 2023-12-29 PROCEDURE — 2500000001 HC RX 250 WO HCPCS SELF ADMINISTERED DRUGS (ALT 637 FOR MEDICARE OP): Performed by: PHARMACIST

## 2023-12-29 PROCEDURE — 2500000001 HC RX 250 WO HCPCS SELF ADMINISTERED DRUGS (ALT 637 FOR MEDICARE OP): Performed by: PHYSICIAN ASSISTANT

## 2023-12-29 RX ORDER — TETRACAINE HYDROCHLORIDE 5 MG/ML
1 SOLUTION OPHTHALMIC ONCE
Status: COMPLETED | OUTPATIENT
Start: 2023-12-29 | End: 2023-12-29

## 2023-12-29 RX ORDER — TOBRAMYCIN 3 MG/ML
2 SOLUTION/ DROPS OPHTHALMIC EVERY 4 HOURS
Status: DISCONTINUED | OUTPATIENT
Start: 2023-12-29 | End: 2023-12-29 | Stop reason: HOSPADM

## 2023-12-29 RX ORDER — IBUPROFEN 600 MG/1
600 TABLET ORAL ONCE
Status: COMPLETED | OUTPATIENT
Start: 2023-12-29 | End: 2023-12-29

## 2023-12-29 RX ORDER — TETRACAINE HYDROCHLORIDE 5 MG/ML
1 SOLUTION OPHTHALMIC ONCE
Status: DISCONTINUED | OUTPATIENT
Start: 2023-12-29 | End: 2023-12-29 | Stop reason: CLARIF

## 2023-12-29 RX ADMIN — TOBRAMYCIN 2 DROP: 3 SOLUTION OPHTHALMIC at 05:42

## 2023-12-29 RX ADMIN — IBUPROFEN 600 MG: 600 TABLET, FILM COATED ORAL at 05:42

## 2023-12-29 RX ADMIN — TETRACAINE HYDROCHLORIDE 1 DROP: 5 SOLUTION OPHTHALMIC at 04:35

## 2023-12-29 RX ADMIN — FLUORESCEIN SODIUM 1 STRIP: 1 STRIP OPHTHALMIC at 04:35

## 2023-12-29 ASSESSMENT — PAIN DESCRIPTION - PAIN TYPE: TYPE: ACUTE PAIN

## 2023-12-29 ASSESSMENT — PAIN SCALES - GENERAL: PAINLEVEL_OUTOF10: 7

## 2023-12-29 ASSESSMENT — COLUMBIA-SUICIDE SEVERITY RATING SCALE - C-SSRS
1. IN THE PAST MONTH, HAVE YOU WISHED YOU WERE DEAD OR WISHED YOU COULD GO TO SLEEP AND NOT WAKE UP?: NO
2. HAVE YOU ACTUALLY HAD ANY THOUGHTS OF KILLING YOURSELF?: NO
6. HAVE YOU EVER DONE ANYTHING, STARTED TO DO ANYTHING, OR PREPARED TO DO ANYTHING TO END YOUR LIFE?: NO

## 2023-12-29 ASSESSMENT — PAIN - FUNCTIONAL ASSESSMENT: PAIN_FUNCTIONAL_ASSESSMENT: 0-10

## 2023-12-29 NOTE — ED PROVIDER NOTES
Chief Complaint   Patient presents with    Eye Pain     Right eye pain and irritation. No known injury     HPI:   Leni Valles is an 28 y.o. female with history of Crohn's, anemia who presents to the ED for evaluation of eye pain x 2 days.  Patient said it started hurting a little bit yesterday evening.  She works night shift.  She said when she woke up it was red, tearing, sensitive to light.  She denies any vision changes, blurred vision, double vision, halos, flashes or floaters.   at bedside thinks that she got scratched by her dog but patient denies this.  She denies foreign body sensation.  Has not taken any medication for pain.  Has not worn her contacts since the pain started.  Rates pain 7/10.  She denies any headache, purulent drainage.    Medications: None  Soc HX: Works as a  at [employer]  Allergies   Allergen Reactions    Lactose Unknown   :  Past Medical History:   Diagnosis Date    Crohn's colitis (CMS/[facility])     Other conditions influencing health status     No significant past medical history     Past Surgical History:   Procedure Laterality Date    OTHER SURGICAL HISTORY  02/03/2022    Small bowel resection - crohn's flare, then with ileostomy and reversal    OTHER SURGICAL HISTORY  05/02/2022    Esophagogastroduodenoscopy    OTHER SURGICAL HISTORY  05/02/2022    Ileostomy closure    OTHER SURGICAL HISTORY  05/02/2022    Colonoscopy     Family History   Problem Relation Name Age of Onset    Breast cancer Mother  40        genetic testing BRCA negative    Uterine cancer Mother  58    Ovarian cancer Mother      Hypertension Father          benign    Skin cancer Father      Lung cancer Maternal Grandmother      Leukemia Maternal Grandfather        Physical Exam  Vitals and nursing note reviewed.   Constitutional:       General: She is not in acute distress.     Appearance: Normal appearance. She is not ill-appearing or toxic-appearing.   HENT:      Right Ear: External ear normal.       Left Ear: External ear normal.   Eyes:      Extraocular Movements: Extraocular movements intact.      Pupils: Pupils are equal, round, and reactive to light.      Comments: No pain or dizziness with eye movement.  Visual acuity not obtained because patient could only read top letter with good eye and both eyes together.  She says this is normal when she is not wearing her contacts.  Right sclera injected.  No foreign body.  Right IOP 13 mmHg.  Punctuate area of fluorescein uptake in the 7 o'clock position right eye concerning for corneal abrasion.  No purulent drainage.  Photophobia.   Cardiovascular:      Rate and Rhythm: Normal rate and regular rhythm.      Pulses: Normal pulses.      Heart sounds: No murmur heard.  Pulmonary:      Effort: Pulmonary effort is normal. No respiratory distress.      Breath sounds: Normal breath sounds.   Musculoskeletal:         General: No deformity or signs of injury. Normal range of motion.   Skin:     General: Skin is warm and dry.      Capillary Refill: Capillary refill takes less than 2 seconds.      Findings: No bruising or rash.   Neurological:      Mental Status: She is alert.      Cranial Nerves: No cranial nerve deficit.     VS: As documented in the triage note and EMR flowsheet from this visit were reviewed.      Medical Decision Making:   ED Course as of 12/29/23 0521   Fri Dec 29, 2023   0420 Vitals Reviewed: Afebrile. Normotensive. Not tachycardic nor tachypneic. No hypoxia.   [KA]   4700 Patient is 28-year-old female presents to the ED for evaluation of right eye pain, tearing and light sensitivity.  On exam her IOP is normal at 13 mmHg.  Not acute glaucoma.  Does not appear to have significantly injected sclera.  Unlikely iritis or scleritis.  No foreign body.  No pain with eye movement I have no concern for orbital cellulitis.  Not conjunctivitis.  Fluorescein staining uptake in the 7 o'clock position concerning for corneal abrasion.  Patient reports complete  shows punctuate area of resolution of pain following tetracaine administration.  Will cover her with tobramycin because she wears contacts.  I have advised that she should do 2 drops every 4 while awake for the next 5 to 7 days.  Advised that she should follow-up with ophthalmology.  Return to ER for any new or worsening symptoms.  She is agreeable. [KA]      ED Course User Index  [KA] Faustina Plata PA-C         Diagnoses as of 12/29/23 0521   Abrasion of right cornea, initial encounter   Acute right eye pain      Chronic Medical Conditions Significantly Affecting Care:      Escalation of Care: Appropriate for outpatient management      Prescription Drug Consideration: Needs pseudomonal coverage due to contact lens      Counseling: Spoke with the patient and discussed today´s findings, in addition to providing specific details for the plan of care and expected course.  Patient was given the opportunity to ask questions.    Discussed return precautions and importance of follow-up.  Advised to follow-up with ophthalmologist.  Advised to return to the ED for changing or worsening symptoms, new symptoms, complaint specific precautions, and precautions listed on the discharge paperwork.  Educated on the common potential side effects of medications prescribed.    I advised the patient that the emergency evaluation and treatment provided today doesn't end their need for medical care. It is very important that they follow-up with their primary care provider or other specialist as instructed.    The plan of care was mutually agreed upon with the patient. The patient and/or family were given the opportunity to ask questions. All questions asked today in the ED were answered to the best of my ability with today's information.    I specifically advised the patient to return to the ED for changing or worsening symptoms, worrisome new symptoms, or for any complaint specific precautions listed on the discharge  paperwork.    This patient was cared for in the setting of nationwide stress on resources and staffing.    This report was transcribed using voice recognition software.  Every effort was made to ensure accuracy, however, inadvertently computerized transcription errors may be present.       Faustina Plata PA-C  12/29/23 0521       Faustina Plata PA-C  12/29/23 0522     No

## 2023-12-29 NOTE — DISCHARGE INSTRUCTIONS
Begin using tobramycin drops.  2 drops right eye every 4 hours while awake.  Follow-up with ophthalmology within the next 2 to 3 days.  If you develop any worsening symptoms including vision changes, worsening pain, purulent drainage or anything else concerning return to nearest ED.  May take Tylenol and ibuprofen for discomfort.

## 2023-12-29 NOTE — Clinical Note
Leni Valles was seen and treated in our emergency department on 12/29/2023.  She may return to work on 01/01/2024.       If you have any questions or concerns, please don't hesitate to call.      Faustina Plata PA-C

## 2023-12-29 NOTE — ED TRIAGE NOTES
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 No known injury.

## 2024-01-04 ENCOUNTER — LAB REQUISITION (OUTPATIENT)
Dept: LAB | Facility: HOSPITAL | Age: 29
End: 2024-01-04
Payer: COMMERCIAL

## 2024-01-04 DIAGNOSIS — Z11.52 ENCOUNTER FOR SCREENING FOR COVID-19: ICD-10-CM

## 2024-01-04 LAB — SARS-COV-2 RNA RESP QL NAA+PROBE: DETECTED

## 2024-01-04 PROCEDURE — 87635 SARS-COV-2 COVID-19 AMP PRB: CPT

## 2024-02-13 ENCOUNTER — INFUSION (OUTPATIENT)
Dept: INFUSION THERAPY | Facility: CLINIC | Age: 29
End: 2024-02-13
Payer: COMMERCIAL

## 2024-02-13 VITALS
OXYGEN SATURATION: 99 % | DIASTOLIC BLOOD PRESSURE: 70 MMHG | RESPIRATION RATE: 16 BRPM | HEART RATE: 64 BPM | TEMPERATURE: 98.2 F | WEIGHT: 135.8 LBS | BODY MASS INDEX: 24.84 KG/M2 | SYSTOLIC BLOOD PRESSURE: 104 MMHG

## 2024-02-13 DIAGNOSIS — K50.019 CROHN'S DISEASE OF SMALL INTESTINE WITH COMPLICATION (MULTI): ICD-10-CM

## 2024-02-13 PROCEDURE — 96365 THER/PROPH/DIAG IV INF INIT: CPT | Performed by: NURSE PRACTITIONER

## 2024-02-13 RX ORDER — EPINEPHRINE 0.3 MG/.3ML
0.3 INJECTION SUBCUTANEOUS EVERY 5 MIN PRN
Status: CANCELLED | OUTPATIENT
Start: 2024-04-09

## 2024-02-13 RX ORDER — ALBUTEROL SULFATE 0.83 MG/ML
3 SOLUTION RESPIRATORY (INHALATION) AS NEEDED
Status: CANCELLED | OUTPATIENT
Start: 2024-04-09

## 2024-02-13 RX ORDER — DIPHENHYDRAMINE HYDROCHLORIDE 50 MG/ML
50 INJECTION INTRAMUSCULAR; INTRAVENOUS AS NEEDED
Status: CANCELLED | OUTPATIENT
Start: 2024-04-09

## 2024-02-13 RX ORDER — FAMOTIDINE 10 MG/ML
20 INJECTION INTRAVENOUS ONCE AS NEEDED
Status: CANCELLED | OUTPATIENT
Start: 2024-04-09

## 2024-02-13 ASSESSMENT — ENCOUNTER SYMPTOMS
SORE THROAT: 0
FREQUENCY: 0
DIZZINESS: 0
CONSTIPATION: 0
DEPRESSION: 0
MYALGIAS: 0
NERVOUS/ANXIOUS: 1
VOMITING: 0
DIARRHEA: 0
NUMBNESS: 0
EYE PROBLEMS: 0
FEVER: 0
WHEEZING: 0
LEG SWELLING: 0
ARTHRALGIAS: 1
TROUBLE SWALLOWING: 0
WOUND: 0
SHORTNESS OF BREATH: 0
CHILLS: 0
EXTREMITY WEAKNESS: 0
HEMATURIA: 0
BLOOD IN STOOL: 0
LIGHT-HEADEDNESS: 0
DYSURIA: 0
HEADACHES: 0
FATIGUE: 1
UNEXPECTED WEIGHT CHANGE: 0
VOICE CHANGE: 0
ABDOMINAL PAIN: 0
COUGH: 0
APPETITE CHANGE: 0
PALPITATIONS: 0
NAUSEA: 0

## 2024-02-13 NOTE — PATIENT INSTRUCTIONS
Today :We administered vedolizumab (Entyvio) 300 mg in sodium chloride 0.9% 255 mL IV.     For:   1. Crohn's disease of small intestine with complication (CMS/[facility])         Your next appointment is due in:  IN 8 WEEKS.       Please read the  Medication Guide that was given to you and reviewed during todays visit.     (Tell all doctors including dentists that you are taking this medication)     Go to the emergency room or call 911 if:  -You have signs of allergic reaction:   -Rash, hives, itching.   -Swollen, blistered, peeling skin.   -Swelling of face, lips, mouth, tongue or throat.   -Tightness of chest, trouble breathing, swallowing or talking     Call your doctor:  - If IV / injection site gets red, warm, swollen, itchy or leaks fluid or pus.     (Leave dressing on your IV site for at least 2 hours and keep area clean and dry  - If you get sick or have symptoms of infection or are not feeling well for any reason.    (Wash your hands often, stay away from people who are sick)  - If you have side effects from your medication that do not go away or are bothersome.     (Refer to the teaching your nurse gave you for side effects to call your doctor about)    - Common side effects may include:  stuffy nose, headache, feeling tired, muscle aches, upset stomach  - Before receiving any vaccines     - Call the [facility] at   If:  - You get sick, are on antibiotics, have had a recent vaccine, have surgery or dental work and your doctor wants your visit rescheduled.  - You need to cancel and reschedule your visit for any reason. Call at least 2 days before your visit if you need to cancel.   - Your insurance changes before your next visit.    (We will need to get approval from your new insurance. This can take up to two weeks.)     The [facility] is opened Monday thru Friday. We are closed on weekends and holidays.   Voice mail will take your call if the center is closed. If you leave a  message please allow 24 hours for a call back during weekdays. If you leave a message on a weekend/holiday, we will call you back the next business day.

## 2024-02-13 NOTE — PROGRESS NOTES
[facility]   infusion Clinic Note   Date: 2024   Name: Leni Valles  : 1995   MRN: [id number]         Reason for Visit: Follow-up and OP Infusion (PATIENT IS HERE FOR ENTYVIO 300 MG INFUSION EVERY 8 WEEKS.)         Visit Type: INFUSION       Ordered By:  DR. JANET QUEEN      Accompanied by:Parent      Diagnosis: Crohn's disease of small intestine with complication (CMS/HCC)       Allergies:   Allergies as of 2024 - Reviewed 2024   Allergen Reaction Noted    Lactose Unknown 2017         Current Medications has a current medication list which includes the following prescription(s): budesonide ec, calcium citrate, calcium citrate-vitamin d3, cholecalciferol, dicyclomine, escitalopram, folic acid, lactase/rennet, mercaptopurine, mesalamine, methocarbamol, multivitamin, naproxen, omeprazole, ondansetron odt, tretinoin, vedolizumab, and zinc gluconate.       Vitals:   Vitals:    24 1107 24 1218 24 1233   BP: (!) 96/94  Comment: nuse notified 101/69  Comment: nurse took vitals 104/70  Comment: nurse took vitals   Pulse: 60 59 64   Resp: 16 18 16   Temp: 36.2 °C (97.2 °F) 36.9 °C (98.4 °F) 36.8 °C (98.2 °F)   SpO2: 98% 99% 99%   Weight: 61.6 kg (135 lb 12.9 oz)               Infusion Pre-procedure Checklist:   - Allergies reviewed: yes   - Medications reviewed: yes       - Previous reaction to current treatment: no      Assess patient for the concerns below. Document provider notification as appropriate.  - Active or recent infection with/without current antibiotic use: no  - Recent or planned invasive dental work: no  - Recent or planned surgeries: no  - Recently received or plans to receive vaccinations: no  - Has treatment related toxicities: no  - Is pregnant:  no      Pain: 0   - Is the pain different from normal: n/a   - Is the pain tolerable: n/a   - Is your Doctor aware:  n/a      Labs: N/A         Fall Risk Screening: Susy Ludwig  Risk  History of Falling, Immediate or Within 3 Months: No  Secondary Diagnosis: No  Ambulatory Aid: Walks without aid/bedrest/nurse assist  Intravenous Therapy/Heparin Lock: Yes  Gait/Transferring: Normal/bedrest/immobile  Mental Status: Oriented to own ability  Jain Fall Risk Score: 20       Review Of Systems:  Review of Systems   Constitutional:  Positive for fatigue. Negative for appetite change, chills, fever and unexpected weight change.        FATIGUE AT BASELINE.   HENT:   Negative for hearing loss, mouth sores, sore throat, tinnitus, trouble swallowing and voice change.    Eyes:  Negative for eye problems.   Respiratory:  Negative for cough, shortness of breath and wheezing.    Cardiovascular:  Negative for chest pain, leg swelling and palpitations.   Gastrointestinal:  Negative for abdominal pain, blood in stool, constipation, diarrhea, nausea and vomiting.        PT WITH A DX OF IBD REPORTS #  2 VARIED BM'S PER DAY. denies NOTING BLOOD REPORTS MUCUS TO STOOLS.  denies C/O OF ABDOMINAL PAIN AND/OR BOUTS OF NOCTURNAL STOOLING.      Genitourinary:  Negative for dysuria, frequency and hematuria.    Musculoskeletal:  Positive for arthralgias. Negative for myalgias.        KNEES AND WRISTS   Skin:  Negative for itching, rash and wound.   Neurological:  Negative for dizziness, extremity weakness, headaches, light-headedness and numbness.   Psychiatric/Behavioral:  Negative for depression. The patient is nervous/anxious.          Infusion Readiness:   - Assessment Concerns Related to Infusion: No  - Provider notified: n/a      Document Below Only If Indicated:   New Patient Education:    N/A (returning patient for continuation of therapy. Ongoing education provided as needed.)        Treatment Conditions & Drug Specific Questions:    Vedolizumab  (ENTYVIO)    (Unless otherwise specified on patient specific therapy plan):     TREATMENT CONDITIONS:  Unless otherwise specified on patient specific therapy plan HOLD  and notify provider prior to proceeding with treatment if:   o Positive Hepatitis B Surface Ag  o Positive T-Spot  o Patient has signs or symptoms of Progressive Multifocal Leukoencephalopath (PML)     HEP B HISTORICALLY NEGATIVE / NON REACTIVE     Lab Results   Component Value Date    TBSIN Negative 05/02/2022    TBGRES Negative  Reference range: NEGATIVE   06/24/2019        Labs reviewed and patient meets treatment conditions? Yes    DRUG SPECIFIC QUESTIONS:  Any signs or symptoms of Progressive Multifocal Leukoencephalopath (PML) which may include: memory loss, trouble thinking, loss of balance, difficulty talking or walking, loss of vision?  No      REMINDERS:  Recommended Vitals/Observation:  Vitals: Monitor vitals at start of infusion, at 15 minutes and at completion of infusion.  Observation: First 3 infusions patient may leave 15 minutes post infusion. Subsequent maintenance infusions: if no reaction, may leave immediately post infusion.        Weight Based Drug Calculations:    WEIGHT BASED DRUGS: NOT APPLICABLE / FLAT DOSE          Initiated By: Haleigh Osborne RN   Time: 1:47 PM     We administered vedolizumab (Entyvio) 300 mg in sodium chloride 0.9% 255 mL IV.

## 2024-04-09 ENCOUNTER — INFUSION (OUTPATIENT)
Dept: INFUSION THERAPY | Facility: CLINIC | Age: 29
End: 2024-04-09
Payer: COMMERCIAL

## 2024-04-09 VITALS
OXYGEN SATURATION: 98 % | BODY MASS INDEX: 23.89 KG/M2 | HEART RATE: 56 BPM | SYSTOLIC BLOOD PRESSURE: 106 MMHG | TEMPERATURE: 97.7 F | WEIGHT: 130.62 LBS | DIASTOLIC BLOOD PRESSURE: 75 MMHG | RESPIRATION RATE: 16 BRPM

## 2024-04-09 DIAGNOSIS — K50.019 CROHN'S DISEASE OF SMALL INTESTINE WITH COMPLICATION (MULTI): ICD-10-CM

## 2024-04-09 PROCEDURE — 96365 THER/PROPH/DIAG IV INF INIT: CPT | Performed by: NURSE PRACTITIONER

## 2024-04-09 RX ORDER — DIPHENHYDRAMINE HYDROCHLORIDE 50 MG/ML
50 INJECTION INTRAMUSCULAR; INTRAVENOUS AS NEEDED
Status: CANCELLED | OUTPATIENT
Start: 2024-06-04

## 2024-04-09 RX ORDER — EPINEPHRINE 0.3 MG/.3ML
0.3 INJECTION SUBCUTANEOUS EVERY 5 MIN PRN
Status: CANCELLED | OUTPATIENT
Start: 2024-06-04

## 2024-04-09 RX ORDER — ALBUTEROL SULFATE 0.83 MG/ML
3 SOLUTION RESPIRATORY (INHALATION) AS NEEDED
Status: CANCELLED | OUTPATIENT
Start: 2024-06-04

## 2024-04-09 RX ORDER — FAMOTIDINE 10 MG/ML
20 INJECTION INTRAVENOUS ONCE AS NEEDED
Status: CANCELLED | OUTPATIENT
Start: 2024-06-04

## 2024-04-09 ASSESSMENT — ENCOUNTER SYMPTOMS
NAUSEA: 0
LIGHT-HEADEDNESS: 0
TROUBLE SWALLOWING: 0
LEG SWELLING: 0
SORE THROAT: 0
EXTREMITY WEAKNESS: 0
FEVER: 0
NERVOUS/ANXIOUS: 1
FREQUENCY: 0
WOUND: 0
DIARRHEA: 0
WHEEZING: 0
FATIGUE: 0
COUGH: 0
CONSTIPATION: 0
HEADACHES: 0
VOMITING: 0
MYALGIAS: 0
BLOOD IN STOOL: 0
ARTHRALGIAS: 1
SHORTNESS OF BREATH: 0
DEPRESSION: 0
NUMBNESS: 0
DIZZINESS: 0
PALPITATIONS: 0
EYE PROBLEMS: 0
UNEXPECTED WEIGHT CHANGE: 0
CHILLS: 0
DYSURIA: 0
HEMATURIA: 0
APPETITE CHANGE: 1
VOICE CHANGE: 0
ABDOMINAL PAIN: 1

## 2024-04-09 NOTE — PROGRESS NOTES
[id number] Health St. [facility]   [facility] Note   Date: 2024   Name: Leni Valles  : 1995   MRN: [id number]         Reason for Visit: Follow-up and OP Infusion (PATIENT IS HERE FOR ENTYVIO 300 MG EVERY 8 WEEKS.)         Visit Type: INFUSION       Ordered By: DR QUEEN      Accompanied by:Parent      Diagnosis: Crohn's disease of small intestine with complication (CMS/HCC)       Allergies:   Allergies as of 2024 - Reviewed 2024   Allergen Reaction Noted    Lactose Unknown 2017         Current Medications has a current medication list which includes the following prescription(s): cholecalciferol, escitalopram, lactase/rennet, multivitamin, naproxen, ondansetron odt, vedolizumab, budesonide ec, calcium citrate, calcium citrate-vitamin d3, dicyclomine, folic acid, mercaptopurine, mesalamine, methocarbamol, omeprazole, tretinoin, and zinc gluconate.       Vitals:   Vitals:    24 1408 24 1530 24 1545   BP: 107/72 110/77 106/75   Pulse: 73 52 56   Resp: 16 16 16   Temp: 36.2 °C (97.2 °F) 36.5 °C (97.7 °F) 36.5 °C (97.7 °F)   SpO2: 96% 97% 98%   Weight: 59.2 kg (130 lb 10 oz)               Infusion Pre-procedure Checklist:   - Allergies reviewed: yes   - Medications reviewed: yes       - Previous reaction to current treatment: no      Assess patient for the concerns below. Document provider notification as appropriate.  - Active or recent infection with/without current antibiotic use: no  - Recent or planned invasive dental work: no  - Recent or planned surgeries: no  - Recently received or plans to receive vaccinations: no  - Has treatment related toxicities: no FATIGUE   - Is pregnant:  no      Pain: 0   - Is the pain different from normal: n/a   - Is the pain tolerable: n/a   - Is your Doctor aware:  n/a      Labs: N/A         Fall Risk Screening: Susy Fall Risk  History of Falling, Immediate or  Within 3 Months: No  Secondary Diagnosis: No  Ambulatory Aid: Walks without aid/bedrest/nurse assist  Intravenous Therapy/Heparin Lock: Yes  Gait/Transferring: Normal/bedrest/immobile  Mental Status: Oriented to own ability  Jain Fall Risk Score: 20       Review Of Systems:  Review of Systems   Constitutional:  Positive for appetite change. Negative for chills, fatigue, fever and unexpected weight change.        DECREASED APPETITE R/T STOMACH PAIN/DISCOMFORT CLOSER TO TIME OF NEXT ENTYVIO DOSE.   HENT:   Negative for hearing loss, mouth sores, sore throat, tinnitus, trouble swallowing and voice change.    Eyes:  Negative for eye problems.   Respiratory:  Negative for cough, shortness of breath and wheezing.    Cardiovascular:  Negative for chest pain, leg swelling and palpitations.   Gastrointestinal:  Positive for abdominal pain. Negative for blood in stool, constipation, diarrhea, nausea and vomiting.        PT WITH A DX OF IBD REPORTS #  1 VARIED BM'S PER DAY. denies NOTING BLOOD/ REPORTS MUCUS TO STOOLS.  denies C/O OF ABDOMINAL PAIN AND/OR BOUTS OF NOCTURNAL STOOLING.      Genitourinary:  Negative for dysuria, frequency and hematuria.    Musculoskeletal:  Positive for arthralgias. Negative for myalgias.        JOINT PAINS: LEFT WRIST, RIGHT KNEE, AND LOWER BACK.   Skin:  Negative for itching, rash and wound.   Neurological:  Negative for dizziness, extremity weakness, headaches, light-headedness and numbness.   Psychiatric/Behavioral:  Negative for depression. The patient is nervous/anxious.         MANAGED ON LEXAPRO.         Infusion Readiness:   - Assessment Concerns Related to Infusion: No  - Provider notified: n/a      Document Below Only If Indicated:   New Patient Education:    N/A (returning patient for continuation of therapy. Ongoing education provided as needed.)        Treatment Conditions & Drug Specific Questions:    Vedolizumab  (ENTYVIO)    (Unless otherwise specified on patient specific  "therapy plan):     TREATMENT CONDITIONS:  Unless otherwise specified on patient specific therapy plan HOLD and notify provider prior to proceeding with treatment if:   o Positive Hepatitis B Surface Ag  o Positive T-Spot  o Patient has signs or symptoms of Progressive Multifocal Leukoencephalopath (PML)     No results found for: \"HAGCN\", \"HEPBSURABI\", \"HBSAG\", \"XHAGF\", \"HEPBSAG\", \"EXTHEPBSAG\", \"NONUHSWGH\"   No results found for: \"NONUHFIRE\", \"NONUHSWGH\", \"NONUHFISH\", \"EXTHEPBSAG\"  Lab Results   Component Value Date    TBSIN Negative 05/02/2022    TBGRES Negative  Reference range: NEGATIVE   06/24/2019        Labs reviewed and patient meets treatment conditions? Yes    DRUG SPECIFIC QUESTIONS:  Any signs or symptoms of Progressive Multifocal Leukoencephalopath (PML) which may include: memory loss, trouble thinking, loss of balance, difficulty talking or walking, loss of vision?  No      REMINDERS:  Recommended Vitals/Observation:  Vitals: Monitor vitals at start of infusion, at 15 minutes and at completion of infusion.  Observation: First 3 infusions patient may leave 15 minutes post infusion. Subsequent maintenance infusions: if no reaction, may leave immediately post infusion.        Weight Based Drug Calculations:    WEIGHT BASED DRUGS: NOT APPLICABLE / FLAT DOSE          Initiated By: Haleigh Osborne RN   Time: 4:22 PM     We administered vedolizumab (Entyvio) 300 mg in sodium chloride 0.9% 255 mL IV.      "

## 2024-04-09 NOTE — PATIENT INSTRUCTIONS
Today :We administered vedolizumab (Entyvio) 300 mg in sodium chloride 0.9% 255 mL IV.     For:   1. Crohn's disease of small intestine with complication (CMS/[facility])         Your next appointment is due in:  EVERY 8 WEEKS.     Please read the  Medication Guide that was given to you and reviewed during todays visit.     (Tell all doctors including dentists that you are taking this medication)     Go to the emergency room or call 911 if:  -You have signs of allergic reaction:   -Rash, hives, itching.   -Swollen, blistered, peeling skin.   -Swelling of face, lips, mouth, tongue or throat.   -Tightness of chest, trouble breathing, swallowing or talking     Call your doctor:  - If IV / injection site gets red, warm, swollen, itchy or leaks fluid or pus.     (Leave dressing on your IV site for at least 2 hours and keep area clean and dry  - If you get sick or have symptoms of infection or are not feeling well for any reason.    (Wash your hands often, stay away from people who are sick)  - If you have side effects from your medication that do not go away or are bothersome.     (Refer to the teaching your nurse gave you for side effects to call your doctor about)    - Common side effects may include:  stuffy nose, headache, feeling tired, muscle aches, upset stomach  - Before receiving any vaccines     - [facility] at   If:  - You get sick, are on antibiotics, have had a recent vaccine, have surgery or dental work and your doctor wants your visit rescheduled.  - You need to cancel and reschedule your visit for any reason. Call at least 2 days before your visit if you need to cancel.   - Your insurance changes before your next visit.    (We will need to get approval from your new insurance. This can take up to two weeks.)     [facility] is opened Monday thru Friday. We are closed on weekends and holidays.   Voice mail will take your call if the center is closed. If you leave a  message please allow 24 hours for a call back during weekdays. If you leave a message on a weekend/holiday, we will call you back the next business day.

## 2024-04-25 ENCOUNTER — OFFICE VISIT (OUTPATIENT)
Dept: GASTROENTEROLOGY | Facility: HOSPITAL | Age: 29
End: 2024-04-25
Payer: COMMERCIAL

## 2024-04-25 ENCOUNTER — LAB (OUTPATIENT)
Dept: LAB | Facility: LAB | Age: 29
End: 2024-04-25
Payer: COMMERCIAL

## 2024-04-25 VITALS
TEMPERATURE: 98.8 F | WEIGHT: 129 LBS | DIASTOLIC BLOOD PRESSURE: 77 MMHG | SYSTOLIC BLOOD PRESSURE: 107 MMHG | OXYGEN SATURATION: 99 % | HEIGHT: 62 IN | HEART RATE: 103 BPM | BODY MASS INDEX: 23.74 KG/M2

## 2024-04-25 DIAGNOSIS — K50.019 CROHN'S DISEASE OF SMALL INTESTINE WITH COMPLICATION (MULTI): ICD-10-CM

## 2024-04-25 DIAGNOSIS — R10.30 LOWER ABDOMINAL PAIN: ICD-10-CM

## 2024-04-25 DIAGNOSIS — K50.019 CROHN'S DISEASE OF SMALL INTESTINE WITH COMPLICATION (MULTI): Primary | ICD-10-CM

## 2024-04-25 LAB
ALBUMIN SERPL BCP-MCNC: 4.3 G/DL (ref 3.4–5)
ALP SERPL-CCNC: 43 U/L (ref 33–110)
ALT SERPL W P-5'-P-CCNC: 14 U/L (ref 7–45)
ANION GAP SERPL CALC-SCNC: 12 MMOL/L (ref 10–20)
AST SERPL W P-5'-P-CCNC: 14 U/L (ref 9–39)
BASOPHILS # BLD AUTO: 0.05 X10*3/UL (ref 0–0.1)
BASOPHILS NFR BLD AUTO: 0.8 %
BILIRUB SERPL-MCNC: 0.7 MG/DL (ref 0–1.2)
BUN SERPL-MCNC: 13 MG/DL (ref 6–23)
CALCIUM SERPL-MCNC: 9 MG/DL (ref 8.6–10.6)
CHLORIDE SERPL-SCNC: 104 MMOL/L (ref 98–107)
CO2 SERPL-SCNC: 29 MMOL/L (ref 21–32)
CREAT SERPL-MCNC: 0.76 MG/DL (ref 0.5–1.05)
CRP SERPL-MCNC: <0.1 MG/DL
EGFRCR SERPLBLD CKD-EPI 2021: >90 ML/MIN/1.73M*2
EOSINOPHIL # BLD AUTO: 0.23 X10*3/UL (ref 0–0.7)
EOSINOPHIL NFR BLD AUTO: 3.6 %
ERYTHROCYTE [DISTWIDTH] IN BLOOD BY AUTOMATED COUNT: 12.4 % (ref 11.5–14.5)
GLUCOSE SERPL-MCNC: 60 MG/DL (ref 74–99)
HCT VFR BLD AUTO: 40.9 % (ref 36–46)
HGB BLD-MCNC: 13.7 G/DL (ref 12–16)
IMM GRANULOCYTES # BLD AUTO: 0.01 X10*3/UL (ref 0–0.7)
IMM GRANULOCYTES NFR BLD AUTO: 0.2 % (ref 0–0.9)
LYMPHOCYTES # BLD AUTO: 1.61 X10*3/UL (ref 1.2–4.8)
LYMPHOCYTES NFR BLD AUTO: 25.2 %
MCH RBC QN AUTO: 27.9 PG (ref 26–34)
MCHC RBC AUTO-ENTMCNC: 33.5 G/DL (ref 32–36)
MCV RBC AUTO: 83 FL (ref 80–100)
MONOCYTES # BLD AUTO: 0.76 X10*3/UL (ref 0.1–1)
MONOCYTES NFR BLD AUTO: 11.9 %
NEUTROPHILS # BLD AUTO: 3.74 X10*3/UL (ref 1.2–7.7)
NEUTROPHILS NFR BLD AUTO: 58.3 %
NRBC BLD-RTO: 0 /100 WBCS (ref 0–0)
PLATELET # BLD AUTO: 273 X10*3/UL (ref 150–450)
POTASSIUM SERPL-SCNC: 4.5 MMOL/L (ref 3.5–5.3)
PROT SERPL-MCNC: 7.1 G/DL (ref 6.4–8.2)
RBC # BLD AUTO: 4.91 X10*6/UL (ref 4–5.2)
SODIUM SERPL-SCNC: 140 MMOL/L (ref 136–145)
WBC # BLD AUTO: 6.4 X10*3/UL (ref 4.4–11.3)

## 2024-04-25 PROCEDURE — 86140 C-REACTIVE PROTEIN: CPT

## 2024-04-25 PROCEDURE — 85025 COMPLETE CBC W/AUTO DIFF WBC: CPT

## 2024-04-25 PROCEDURE — 80053 COMPREHEN METABOLIC PANEL: CPT

## 2024-04-25 PROCEDURE — 99214 OFFICE O/P EST MOD 30 MIN: CPT | Performed by: INTERNAL MEDICINE

## 2024-04-25 PROCEDURE — 1036F TOBACCO NON-USER: CPT | Performed by: INTERNAL MEDICINE

## 2024-04-25 PROCEDURE — 36415 COLL VENOUS BLD VENIPUNCTURE: CPT

## 2024-04-25 RX ORDER — AMITRIPTYLINE HYDROCHLORIDE 25 MG/1
25 TABLET, FILM COATED ORAL DAILY
Qty: 30 TABLET | Refills: 2 | Status: SHIPPED | OUTPATIENT
Start: 2024-04-25 | End: 2024-05-17

## 2024-04-25 ASSESSMENT — PAIN SCALES - GENERAL: PAINLEVEL: 3

## 2024-04-25 ASSESSMENT — ENCOUNTER SYMPTOMS
CHILLS: 0
EYE PAIN: 0
SHORTNESS OF BREATH: 0
ARTHRALGIAS: 1
UNEXPECTED WEIGHT CHANGE: 0
WEAKNESS: 0
FEVER: 0
ROS GI COMMENTS: SEE HPI

## 2024-04-25 NOTE — PATIENT INSTRUCTIONS
Check routine labs: CBC, CMP, CRP, and fecal calprotectin.  Prescribe amitriptyline 25 mg daily.  Continue taking dicyclomine as needed for pain.  Schedule for colonoscopy.  Follow-up in 4 months.

## 2024-04-25 NOTE — PROGRESS NOTES
Subjective     History of Present Illness:   Leni Valles is a 28 y.o. female  with Crohn's disease s/p ileocolectomy in 2012 who presented to GI clinic for follow-up.  She has been doing well until about 3 weeks ago when she notices having epigastric and bilateral abdominal pain daily.  Her bowel movements are variable, ranging from 1 formed stool to 2-3 soft Bms.  She reports occasional fecal urgency.  She had Entyvio infusion about a month ago.  She takes dicyclomine about once or twice a week which seems to help.  She has noticed any particular food triggers.  She tried budesonide a few times earlier this year but did not notice any difference.  She reports having lower back, wrist and knee pain.  She had a rash on her left leg recently that was not pruritic or painful.  The rash has resolved on its own.    IBD History  Patient was diagnosed with Crohn's disease at age 13. Her symptoms at the time were abdominal pain and constipation. She underwent EGD and colonoscopy in 2009 with Dr. Oconnell at [facility] which reportedly showed ileitis. Patient was initially placed on Pentasa. She later switched her care to Dr. Bansal at [facility] at age 15 and continued on Pentasa. 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 She underwent ileocolonic resection in February 2012 and reportedly had one foot of ileum resected. She resumed Pentasa and added mercaptopurine 75 mg daily post surgery.   She transitioned to adult GI at [facility] with Dr. Gee in 2020 and continued mercaptopurine and Pentasa. Patient had colonoscopy in February 2021 which reportedly showed minimal ileal erosions. Pathology showed patchy ileitis and normal colonic biopsies. MRE 8/18/21 showed no evidence of active CD. She experienced symptoms of weight loss, intermittent abdominal pain, and diarrhea in 2020. In November 2021, patient was placed on vedolizumab infusions. Pentasa & mercaptopurine were  discontinued in March 2022.   Patient established care with our clinic in May 2022. She complained of having diarrhea and abdominal pain with food intake then. She underwent diagnostic EGD and colonoscopy on 7/8/22 which showed 1 cm hiatal hernia, normal hafsa-TI, and colon. Capsule endoscopy on 8/22/22 showed normal small bowel. She did well until summer 2023 when she reported experiencing epigastric, RLQ pain, decreased appetite, and increase stool frequency.  These symptoms occured after she missed her Entyvio dose in August 2023 which was resumed in October 2023.      Review of Systems  Review of Systems   Constitutional:  Negative for chills, fever and unexpected weight change.   HENT:  Negative for mouth sores.    Eyes:  Negative for pain.   Respiratory:  Negative for shortness of breath.    Cardiovascular:  Negative for chest pain.   Gastrointestinal:         See HPI   Musculoskeletal:  Positive for arthralgias.   Skin:  Positive for rash.   Neurological:  Negative for weakness.       Past Medical History   has a past medical history of Crohn's colitis (Multi) and Other conditions influencing health status.     Social History   reports that she has never smoked. She has never used smokeless tobacco. She reports current alcohol use of about 1.0 standard drink of alcohol per week. She reports that she does not use drugs.   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    Family History  family history includes Breast cancer (age of onset: 40) in her mother; Hypertension in her father; Leukemia in her maternal grandfather; Lung cancer in her maternal grandmother; Ovarian cancer in her mother; Skin cancer in her father; Uterine cancer (age of onset: 58) in her mother.     Allergies  Allergies   Allergen Reactions    Lactose Unknown       Medications  Current Outpatient Medications   Medication Instructions    budesonide EC (ENTOCORT EC) 9 mg, oral, Daily    CALCIUM CITRATE ORAL oral, Daily, 2 gummies    calcium  citrate-vitamin D3 (Citracal + D Maximum) 315 mg-6.25 mcg (250 unit) tablet 1 tablet, oral, 2 times daily, For 30 days    cholecalciferol (VITAMIN D-3) 3,000 Units, oral, Daily    dicyclomine (BENTYL) 10 mg, oral, 3 times daily PRN    escitalopram (LEXAPRO) 15 mg, oral, Daily    folic acid (FOLVITE) 1 mg, oral, Daily, For 30 days    LACTASE-RENNET ORAL 1 tablet, oral, 3 times daily with meals, lactase-rennet 25-2 mg ORAL tablet    mercaptopurine (PURINETHOL) 100 mg, oral, Daily    mesalamine (PENTASA) 1,000 mg, oral, 4 times daily, For 30 days    methocarbamol (Robaxin) 500 mg tablet 1-2 tablets, oral, Every 12 hours PRN, May cause sleepiness    multivitamin capsule 1 capsule, oral, Daily    naproxen (NAPROSYN) 500 mg, oral, Every 12 hours PRN    omeprazole (PRILOSEC) 40 mg, oral, Daily, Delayed Release capsule - Do not crush or chew.    ondansetron ODT (ZOFRAN-ODT) 4 mg, oral, As needed, 1-2x daily    tretinoin (Retin-A) 0.05 % cream 1 Application, Topical, Every evening, To face    vedolizumab (ENTYVIO) 300 mg, intravenous, Every 8 weeks    ZINC GLUCONATE ORAL 1 each, oral, Daily        Objective   Visit Vitals  /77   Pulse 103   Temp 37.1 °C (98.8 °F)      Body mass index is 23.59 kg/m².  Physical Exam  Constitutional:       Appearance: Normal appearance.   HENT:      Head: Normocephalic and atraumatic.   Eyes:      General: No scleral icterus.  Cardiovascular:      Rate and Rhythm: Normal rate and regular rhythm.   Pulmonary:      Effort: Pulmonary effort is normal.      Breath sounds: Normal breath sounds. No wheezing.   Abdominal:      General: Bowel sounds are normal.      Palpations: Abdomen is soft. There is no mass.      Tenderness: There is abdominal tenderness. There is no guarding or rebound.      Comments: Tenderness to palpation in RLQ and LLQ quadrant to palpation   Musculoskeletal:         General: Normal range of motion.      Cervical back: Normal range of motion.   Skin:     Coloration: Skin  "is not jaundiced.   Neurological:      Mental Status: She is alert and oriented to person, place, and time.         Labs  Lab Results   Component Value Date    WBC 6.7 10/02/2023    HGB 12.9 10/02/2023    HCT 39.4 10/02/2023    MCV 86 10/02/2023     10/02/2023     Lab Results   Component Value Date    GLUCOSE 79 10/02/2023    CALCIUM 9.5 10/02/2023     10/02/2023    K 4.2 10/02/2023    CO2 31 10/02/2023     10/02/2023    BUN 10 10/02/2023    CREATININE 0.64 10/02/2023     Lab Results   Component Value Date    ALT 12 10/02/2023    AST 15 10/02/2023    ALKPHOS 48 10/02/2023    BILITOT 0.8 10/02/2023     No results found for: \"INR\"  Lab Results   Component Value Date    CRP <0.10 10/02/2023     Lab Results   Component Value Date    CALPS 20 04/05/2023    CALPS 11 05/13/2022       Assessment/Plan   Leni Valles is a 28 y.o. female with Crohn's disease s/p ileocolectomy in 2012 who presented to GI clinic for follow-up.  Patient has been doing well until experiencing mild lower abdominal pain about 3-4 weeks ago.  She has variable bowel frequency/consistency that has been at baseline.  I would like to check labs including stool inflammatory marker and schedule for diagnostic colonoscopy to rule out active Crohn's disease.  She has had similar symptoms previously with no evidence of inflammation on prior evaluations so IBS is in the differential diagnosis.  I will prescribe amitriptyline 25 mg daily to help with pain and patient can continue taking dicyclomine as needed.  If colonoscopy shows active inflammation, we will consider switching to another biologic.    Crohn's disease:  Age at diagnosis: 13   Disease Extent: ileal disease  Disease Behavior: stricturing  Current symptoms: mild lower abdominal pain  Prior Medications: 6-MP 75 mg daily, Pentasa  Active Medications: vedolizumab every 8 weeks  Last endoscopy: EGD and colonoscopy on 7/8/22 showed 1 cm hiatal hernia, normal hafsa-TI, and colon. " Capsule endoscopy on 8/22/22 showed normal small bowel  Last imaging studies: MRE on 8/18/21 showed no evidence of active CD  EIMs: possible mild peripheral spondyloarthropathy in wrist and knees.    Patient Instructions  Check routine labs: CBC, CMP, CRP, and fecal calprotectin.  Prescribe amitriptyline 25 mg daily.  Continue taking dicyclomine as needed for pain.  Schedule for colonoscopy.  Follow-up in 4 months.    Gordon Toussaint MD

## 2024-05-17 DIAGNOSIS — R10.30 LOWER ABDOMINAL PAIN: ICD-10-CM

## 2024-05-17 RX ORDER — AMITRIPTYLINE HYDROCHLORIDE 25 MG/1
25 TABLET, FILM COATED ORAL DAILY
Qty: 90 TABLET | Refills: 1 | Status: SHIPPED | OUTPATIENT
Start: 2024-05-17

## 2024-06-04 ENCOUNTER — INFUSION (OUTPATIENT)
Dept: INFUSION THERAPY | Facility: CLINIC | Age: 29
End: 2024-06-04
Payer: COMMERCIAL

## 2024-06-04 VITALS
RESPIRATION RATE: 16 BRPM | BODY MASS INDEX: 23.35 KG/M2 | WEIGHT: 127.65 LBS | HEART RATE: 56 BPM | OXYGEN SATURATION: 95 % | DIASTOLIC BLOOD PRESSURE: 76 MMHG | TEMPERATURE: 97.4 F | SYSTOLIC BLOOD PRESSURE: 111 MMHG

## 2024-06-04 DIAGNOSIS — K50.019 CROHN'S DISEASE OF SMALL INTESTINE WITH COMPLICATION (MULTI): ICD-10-CM

## 2024-06-04 PROCEDURE — 96365 THER/PROPH/DIAG IV INF INIT: CPT | Performed by: NURSE PRACTITIONER

## 2024-06-04 RX ORDER — FAMOTIDINE 10 MG/ML
20 INJECTION INTRAVENOUS ONCE AS NEEDED
OUTPATIENT
Start: 2024-07-30

## 2024-06-04 RX ORDER — EPINEPHRINE 0.3 MG/.3ML
0.3 INJECTION SUBCUTANEOUS EVERY 5 MIN PRN
OUTPATIENT
Start: 2024-07-30

## 2024-06-04 RX ORDER — DIPHENHYDRAMINE HYDROCHLORIDE 50 MG/ML
50 INJECTION INTRAMUSCULAR; INTRAVENOUS AS NEEDED
OUTPATIENT
Start: 2024-07-30

## 2024-06-04 RX ORDER — ALBUTEROL SULFATE 0.83 MG/ML
3 SOLUTION RESPIRATORY (INHALATION) AS NEEDED
OUTPATIENT
Start: 2024-07-30

## 2024-06-04 ASSESSMENT — ENCOUNTER SYMPTOMS
SORE THROAT: 0
LIGHT-HEADEDNESS: 0
ARTHRALGIAS: 1
WHEEZING: 0
NAUSEA: 0
HEMATURIA: 0
DEPRESSION: 0
NUMBNESS: 0
SHORTNESS OF BREATH: 0
ABDOMINAL PAIN: 0
MYALGIAS: 0
DIZZINESS: 0
BLOOD IN STOOL: 0
HEADACHES: 0
NERVOUS/ANXIOUS: 1
EYE PROBLEMS: 0
VOICE CHANGE: 0
DIARRHEA: 0
UNEXPECTED WEIGHT CHANGE: 0
VOMITING: 0
PALPITATIONS: 0
EXTREMITY WEAKNESS: 0
FEVER: 0
DYSURIA: 0
APPETITE CHANGE: 0
FATIGUE: 0
WOUND: 0
FREQUENCY: 0
COUGH: 0
CONSTIPATION: 0
TROUBLE SWALLOWING: 0
CHILLS: 0
LEG SWELLING: 0

## 2024-06-04 NOTE — PROGRESS NOTES
[facility]   Infusion Clinic Note   Date: 2024   Name: Leni Valles  : 1995   MRN: [id number]         Reason for Visit: Follow-up and OP Infusion (PATIENT IS HERE FOR ENTYVIO 300 MG INFUSION EVERY 8 WEEKS.)         Today: We administered vedolizumab (Entyvio) 300 mg in sodium chloride 0.9% 255 mL IV.       Visit Type: INFUSION       Ordered By: DR. QUEEN      Accompanied by:Parent      Diagnosis: Crohn's disease of small intestine with complication (Multi)       Allergies:   Allergies as of 2024 - Reviewed 2024   Allergen Reaction Noted    Lactose Unknown 2017         Current Medications has a current medication list which includes the following prescription(s): amitriptyline, dicyclomine, escitalopram, lactase/rennet, multivitamin, vedolizumab, naproxen, and zinc gluconate.       Vitals:   Vitals:    24 1115 24 1215 24 1228   BP: 108/75 96/66 111/76   Pulse: 63 65 56   Resp: 16 16 16   Temp: 36.4 °C (97.5 °F) 36.4 °C (97.5 °F) 36.3 °C (97.4 °F)   SpO2: 99% 98% 95%   Weight: 57.9 kg (127 lb 10.3 oz)               Infusion Pre-procedure Checklist:   - Allergies reviewed: yes   - Medications reviewed: yes       - Previous reaction to current treatment: no      Assess patient for the concerns below. Document provider notification as appropriate.  - Active or recent infection with/without current antibiotic use: no  - Recent or planned invasive dental work: no  - Recent or planned surgeries: no  - Recently received or plans to receive vaccinations: no  - Has treatment related toxicities: no DOES FEEL FATIGUED 1-2 DAYS AFTER INFUSION  - Is pregnant:  no      Pain: 0   - Is the pain different from normal: n/a   - Is the pain tolerable: n/a   - Is your Doctor aware:  n/a      Labs: N/A         Fall Risk Screening: Susy Fall Risk  History of Falling, Immediate or Within 3 Months: No  Secondary Diagnosis: No  Ambulatory Aid: Walks without  aid/bedrest/nurse assist  Intravenous Therapy/Heparin Lock: Yes  Gait/Transferring: Normal/bedrest/immobile  Mental Status: Oriented to own ability  Jain Fall Risk Score: 20       Review Of Systems:  Review of Systems   Constitutional:  Negative for appetite change, chills, fatigue, fever and unexpected weight change.   HENT:   Negative for hearing loss, mouth sores, sore throat, tinnitus, trouble swallowing and voice change.    Eyes:  Negative for eye problems.   Respiratory:  Negative for cough, shortness of breath and wheezing.    Cardiovascular:  Negative for chest pain, leg swelling and palpitations.   Gastrointestinal:  Negative for abdominal pain, blood in stool, constipation, diarrhea, nausea and vomiting.        PT WITH A DX OF IBD REPORTS #  2 LOOSE  BM'S PER DAY. denies NOTING BLOOD/ REPORTS MUCUS TO STOOLS.  denies C/O OF ABDOMINAL PAIN AND/OR BOUTS OF NOCTURNAL STOOLING.      Genitourinary:  Negative for dysuria, frequency and hematuria.    Musculoskeletal:  Positive for arthralgias. Negative for myalgias.        LEFT WRIST, RIGHT KNEE PAIN POSSIBLY RELATED TO CROHN'S.   Skin:  Negative for itching, rash and wound.   Neurological:  Negative for dizziness, extremity weakness, headaches, light-headedness and numbness.   Psychiatric/Behavioral:  Negative for depression. The patient is nervous/anxious.          Infusion Readiness:   - Assessment Concerns Related to Infusion: No  - Provider notified: n/a      Document Below Only If Indicated:   New Patient Education:    N/A (returning patient for continuation of therapy. Ongoing education provided as needed.)        Treatment Conditions & Drug Specific Questions:    Vedolizumab  (ENTYVIO)    (Unless otherwise specified on patient specific therapy plan):     TREATMENT CONDITIONS:  Unless otherwise specified on patient specific therapy plan HOLD and notify provider prior to proceeding with treatment if:   o Positive Hepatitis B Surface Ag  o Positive  "T-Spot  o Patient has signs or symptoms of Progressive Multifocal Leukoencephalopath (PML)     No results found for: \"HAGCN\", \"HEPBSURABI\", \"HBSAG\", \"XHAGF\", \"HEPBSAG\", \"EXTHEPBSAG\", \"NONUHSWGH\"   No results found for: \"NONUHFIRE\", \"NONUHSWGH\", \"NONUHFISH\", \"EXTHEPBSAG\"  Lab Results   Component Value Date    TBSIN Negative 05/02/2022    TBGRES Negative  Reference range: NEGATIVE   06/24/2019        Labs reviewed and patient meets treatment conditions? Yes    DRUG SPECIFIC QUESTIONS:  Any signs or symptoms of Progressive Multifocal Leukoencephalopath (PML) which may include: memory loss, trouble thinking, loss of balance, difficulty talking or walking, loss of vision?  No      REMINDERS:  Recommended Vitals/Observation:  Vitals: Monitor vitals at start of infusion, at 15 minutes and at completion of infusion.  Observation: First 3 infusions patient may leave 15 minutes post infusion. Subsequent maintenance infusions: if no reaction, may leave immediately post infusion.        Weight Based Drug Calculations:    WEIGHT BASED DRUGS: NOT APPLICABLE / FLAT DOSE          Initiated By: Haleigh Osborne RN        "

## 2024-06-04 NOTE — PATIENT INSTRUCTIONS
Today :We administered vedolizumab (Entyvio) 300 mg in sodium chloride 0.9% 255 mL IV.     For:   1. Crohn's disease of small intestine with complication (Multi)         Your next appointment is due in:  8 WEEKS.       Please read the  Medication Guide that was given to you and reviewed during todays visit.     (Tell all doctors including dentists that you are taking this medication)     Go to the emergency room or call 911 if:  -You have signs of allergic reaction:   -Rash, hives, itching.   -Swollen, blistered, peeling skin.   -Swelling of face, lips, mouth, tongue or throat.   -Tightness of chest, trouble breathing, swallowing or talking     Call your doctor:  - If IV / injection site gets red, warm, swollen, itchy or leaks fluid or pus.     (Leave dressing on your IV site for at least 2 hours and keep area clean and dry  - If you get sick or have symptoms of infection or are not feeling well for any reason.    (Wash your hands often, stay away from people who are sick)  - If you have side effects from your medication that do not go away or are bothersome.     (Refer to the teaching your nurse gave you for side effects to call your doctor about)    - Common side effects may include:  stuffy nose, headache, feeling tired, muscle aches, upset stomach  - Before receiving any vaccines     - Call the [facility] at   If:  - You get sick, are on antibiotics, have had a recent vaccine, have surgery or dental work and your doctor wants your visit rescheduled.  - You need to cancel and reschedule your visit for any reason. Call at least 2 days before your visit if you need to cancel.   - Your insurance changes before your next visit.    (We will need to get approval from your new insurance. This can take up to two weeks.)     [facility] is opened Monday thru Friday. We are closed on weekends and holidays.   Voice mail will take your call if the center is closed. If you leave a message  please allow 24 hours for a call back during weekdays. If you leave a message on a weekend/holiday, we will call you back the next business day.

## 2024-07-23 DIAGNOSIS — K50.019 CROHN'S DISEASE OF SMALL INTESTINE WITH COMPLICATION (MULTI): Primary | ICD-10-CM

## 2024-07-30 ENCOUNTER — APPOINTMENT (OUTPATIENT)
Dept: INFUSION THERAPY | Facility: CLINIC | Age: 29
End: 2024-07-30
Payer: COMMERCIAL

## 2024-07-30 VITALS
TEMPERATURE: 98 F | HEART RATE: 69 BPM | RESPIRATION RATE: 16 BRPM | BODY MASS INDEX: 23.95 KG/M2 | WEIGHT: 130.95 LBS | DIASTOLIC BLOOD PRESSURE: 74 MMHG | SYSTOLIC BLOOD PRESSURE: 106 MMHG | OXYGEN SATURATION: 99 %

## 2024-07-30 DIAGNOSIS — K50.019 CROHN'S DISEASE OF SMALL INTESTINE WITH COMPLICATION (MULTI): ICD-10-CM

## 2024-07-30 PROCEDURE — 96365 THER/PROPH/DIAG IV INF INIT: CPT | Performed by: NURSE PRACTITIONER

## 2024-07-30 RX ORDER — ALBUTEROL SULFATE 0.83 MG/ML
3 SOLUTION RESPIRATORY (INHALATION) AS NEEDED
OUTPATIENT
Start: 2024-09-24

## 2024-07-30 RX ORDER — DIPHENHYDRAMINE HYDROCHLORIDE 50 MG/ML
50 INJECTION INTRAMUSCULAR; INTRAVENOUS AS NEEDED
OUTPATIENT
Start: 2024-09-24

## 2024-07-30 RX ORDER — EPINEPHRINE 0.3 MG/.3ML
0.3 INJECTION SUBCUTANEOUS EVERY 5 MIN PRN
OUTPATIENT
Start: 2024-09-24

## 2024-07-30 RX ORDER — FAMOTIDINE 10 MG/ML
20 INJECTION INTRAVENOUS ONCE AS NEEDED
OUTPATIENT
Start: 2024-09-24

## 2024-07-30 ASSESSMENT — ENCOUNTER SYMPTOMS
PALPITATIONS: 0
WHEEZING: 0
ARTHRALGIAS: 1
SORE THROAT: 0
CHILLS: 0
CONSTIPATION: 0
COUGH: 0
NERVOUS/ANXIOUS: 1
DEPRESSION: 0
EXTREMITY WEAKNESS: 0
LEG SWELLING: 0
ABDOMINAL PAIN: 1
HEMATURIA: 0
VOICE CHANGE: 0
LIGHT-HEADEDNESS: 0
EYE PROBLEMS: 0
FATIGUE: 0
NAUSEA: 1
TROUBLE SWALLOWING: 0
WOUND: 0
SHORTNESS OF BREATH: 0
HEADACHES: 0
BLOOD IN STOOL: 0
MYALGIAS: 0
FEVER: 0
DIARRHEA: 0
DIZZINESS: 0
DYSURIA: 0
UNEXPECTED WEIGHT CHANGE: 0
NUMBNESS: 0
FREQUENCY: 0
APPETITE CHANGE: 0
VOMITING: 0

## 2024-07-30 NOTE — PROGRESS NOTES
[facility]   Infusion Clinic Note   Date: 2024   Name: Leni Valles  : 1995   MRN: [id number]         Reason for Visit: OP Infusion and Follow-up (PT HERE FOR Q56 DAY ENTYVIO 300MG INFUSION)         Today: We administered vedolizumab (Entyvio) 300 mg in sodium chloride 0.9% 255 mL IV.       Visit Type: INFUSION       Ordered By: DR. QUEEN      Accompanied by:Parent      Diagnosis: Crohn's disease of small intestine with complication (Multi)       Allergies:   Allergies as of 2024 - Reviewed 2024   Allergen Reaction Noted    Lactose Unknown 2017         Current Medications has a current medication list which includes the following prescription(s): amitriptyline, dicyclomine, escitalopram, lactase/rennet, multivitamin, naproxen, vedolizumab, vedolizumab, and zinc gluconate.       Vitals:   Vitals:    24 1315 24 1346 24 1420   BP: 105/70 119/79 106/74   Pulse: 71 72 69   Resp: 16 16 16   Temp: 36.2 °C (97.2 °F) 36.7 °C (98 °F) 36.7 °C (98 °F)   SpO2: 99% 99% 99%   Weight: 59.4 kg (130 lb 15.3 oz) 59.4 kg (130 lb 15.3 oz)              Infusion Pre-procedure Checklist:   - Allergies reviewed: yes   - Medications reviewed: yes       - Previous reaction to current treatment: no      Assess patient for the concerns below. Document provider notification as appropriate.  - Active or recent infection with/without current antibiotic use: no  - Recent or planned invasive dental work: no  - Recent or planned surgeries: no  - Recently received or plans to receive vaccinations: no  - Has treatment related toxicities: n/a  - Is pregnant:  no      Pain: 0   - Is the pain different from normal: n/a   - Is the pain tolerable: n/a   - Is your Doctor aware:  n/a      Labs: N/A         Fall Risk Screening: Susy Fall Risk  History of Falling, Immediate or Within 3 Months: No  Secondary Diagnosis: No  Ambulatory Aid: Walks without aid/bedrest/nurse  assist  Intravenous Therapy/Heparin Lock: Yes  Gait/Transferring: Normal/bedrest/immobile  Mental Status: Oriented to own ability  Jain Fall Risk Score: 20       Review Of Systems:  Review of Systems   Constitutional:  Negative for appetite change, chills, fatigue, fever and unexpected weight change.   HENT:   Negative for hearing loss, mouth sores, sore throat, tinnitus, trouble swallowing and voice change.    Eyes:  Negative for eye problems.   Respiratory:  Negative for cough, shortness of breath and wheezing.    Cardiovascular:  Negative for chest pain, leg swelling and palpitations.   Gastrointestinal:  Positive for abdominal pain and nausea. Negative for blood in stool, constipation, diarrhea and vomiting.        ADMITS TO 1-2 STOOLS PER DAY, FORMED/SOFT IN CONSISTENCY.  DENIES BLOOD IN STOOL, ADMITS TO INTERMITTENT MUCOUS IN STOOL.  INTERMITTENT ABDOMINAL PAIN.  DENIES NOCTURNAL STOOLING.  INTERMITTENT NAUSEA.   Genitourinary:  Negative for dysuria, frequency and hematuria.    Musculoskeletal:  Positive for arthralgias. Negative for myalgias.        R KNEE AND L WRIST JOINT PAIN - BASELINE, MD AWARE   Skin:  Negative for itching, rash and wound.   Neurological:  Negative for dizziness, extremity weakness, headaches, light-headedness and numbness.   Psychiatric/Behavioral:  Negative for depression. The patient is nervous/anxious.         ANXIETY MANAGED WITH MEDICATION         Infusion Readiness:   - Assessment Concerns Related to Infusion: No  - Provider notified: n/a      Document Below Only If Indicated:   New Patient Education:    N/A (returning patient for continuation of therapy. Ongoing education provided as needed.)        Treatment Conditions & Drug Specific Questions:    Vedolizumab  (ENTYVIO)    (Unless otherwise specified on patient specific therapy plan):     TREATMENT CONDITIONS:  Unless otherwise specified on patient specific therapy plan HOLD and notify provider prior to proceeding with  "treatment if:   o Positive Hepatitis B Surface Ag  o Positive T-Spot  o Patient has signs or symptoms of Progressive Multifocal Leukoencephalopath (PML)     No results found for: \"HAGCN\", \"HEPBSURABI\", \"HBSAG\", \"XHAGF\", \"HEPBSAG\", \"EXTHEPBSAG\", \"NONUHSWGH\"   No results found for: \"NONUHFIRE\", \"NONUHSWGH\", \"NONUHFISH\", \"EXTHEPBSAG\"  Lab Results   Component Value Date    TBSIN Negative 05/02/2022    TBGRES Negative  Reference range: NEGATIVE   06/24/2019        Labs reviewed and patient meets treatment conditions? Yes    DRUG SPECIFIC QUESTIONS:  Any signs or symptoms of Progressive Multifocal Leukoencephalopath (PML) which may include: memory loss, trouble thinking, loss of balance, difficulty talking or walking, loss of vision?  No      REMINDERS:  Recommended Vitals/Observation:  Vitals: Monitor vitals at start of infusion, at 15 minutes and at completion of infusion.  Observation: First 3 infusions patient may leave 15 minutes post infusion. Subsequent maintenance infusions: if no reaction, may leave immediately post infusion.        Weight Based Drug Calculations:    WEIGHT BASED DRUGS: NOT APPLICABLE / FLAT DOSE          Initiated By: Rachel Anglin RN        "

## 2024-07-30 NOTE — PATIENT INSTRUCTIONS
Today :We administered vedolizumab (Entyvio) 300 mg in sodium chloride 0.9% 255 mL IV.     For:   1. Crohn's disease of small intestine with complication (Multi)         Your next appointment is due in:  56 DAYS        Please read the  Medication Guide that was given to you and reviewed during todays visit.     (Tell all doctors including dentists that you are taking this medication)     Go to the emergency room or call 911 if:  -You have signs of allergic reaction:   -Rash, hives, itching.   -Swollen, blistered, peeling skin.   -Swelling of face, lips, mouth, tongue or throat.   -Tightness of chest, trouble breathing, swallowing or talking     Call your doctor:  - If IV / injection site gets red, warm, swollen, itchy or leaks fluid or pus.     (Leave dressing on your IV site for at least 2 hours and keep area clean and dry  - If you get sick or have symptoms of infection or are not feeling well for any reason.    (Wash your hands often, stay away from people who are sick)  - If you have side effects from your medication that do not go away or are bothersome.     (Refer to the teaching your nurse gave you for side effects to call your doctor about)    - Common side effects may include:  stuffy nose, headache, feeling tired, muscle aches, upset stomach  - Before receiving any vaccines     - [facility] at   If:  - You get sick, are on antibiotics, have had a recent vaccine, have surgery or dental work and your doctor wants your visit rescheduled.  - You need to cancel and reschedule your visit for any reason. Call at least 2 days before your visit if you need to cancel.   - Your insurance changes before your next visit.    (We will need to get approval from your new insurance. This can take up to two weeks.)     [facility] is opened Monday thru Friday. We are closed on weekends and holidays.   Voice mail will take your call if the center is closed. If you leave a message  please allow 24 hours for a call back during weekdays. If you leave a message on a weekend/holiday, we will call you back the next business day.

## 2024-09-25 ENCOUNTER — APPOINTMENT (OUTPATIENT)
Dept: INFUSION THERAPY | Facility: CLINIC | Age: 29
End: 2024-09-25
Payer: COMMERCIAL

## 2024-09-25 VITALS
BODY MASS INDEX: 24.76 KG/M2 | OXYGEN SATURATION: 99 % | RESPIRATION RATE: 18 BRPM | DIASTOLIC BLOOD PRESSURE: 75 MMHG | SYSTOLIC BLOOD PRESSURE: 108 MMHG | WEIGHT: 135.36 LBS | HEART RATE: 59 BPM | TEMPERATURE: 98.2 F

## 2024-09-25 DIAGNOSIS — K50.019 CROHN'S DISEASE OF SMALL INTESTINE WITH COMPLICATION (MULTI): ICD-10-CM

## 2024-09-25 PROCEDURE — 96365 THER/PROPH/DIAG IV INF INIT: CPT | Performed by: NURSE PRACTITIONER

## 2024-09-25 RX ORDER — DIPHENHYDRAMINE HYDROCHLORIDE 50 MG/ML
50 INJECTION INTRAMUSCULAR; INTRAVENOUS AS NEEDED
OUTPATIENT
Start: 2024-11-19

## 2024-09-25 RX ORDER — ALBUTEROL SULFATE 0.83 MG/ML
3 SOLUTION RESPIRATORY (INHALATION) AS NEEDED
OUTPATIENT
Start: 2024-11-19

## 2024-09-25 RX ORDER — FAMOTIDINE 10 MG/ML
20 INJECTION INTRAVENOUS ONCE AS NEEDED
OUTPATIENT
Start: 2024-11-19

## 2024-09-25 RX ORDER — EPINEPHRINE 0.3 MG/.3ML
0.3 INJECTION SUBCUTANEOUS EVERY 5 MIN PRN
OUTPATIENT
Start: 2024-11-19

## 2024-09-25 ASSESSMENT — ENCOUNTER SYMPTOMS
VOMITING: 0
PALPITATIONS: 0
UNEXPECTED WEIGHT CHANGE: 0
NUMBNESS: 0
SORE THROAT: 0
MYALGIAS: 0
DIZZINESS: 0
HEMATURIA: 0
WHEEZING: 0
CONSTIPATION: 0
TROUBLE SWALLOWING: 0
CHILLS: 0
HEADACHES: 0
DEPRESSION: 0
EYE PROBLEMS: 0
FREQUENCY: 0
BLOOD IN STOOL: 0
COUGH: 0
NAUSEA: 0
VOICE CHANGE: 0
APPETITE CHANGE: 0
NERVOUS/ANXIOUS: 1
LEG SWELLING: 0
EXTREMITY WEAKNESS: 0
FATIGUE: 0
DYSURIA: 0
FEVER: 0
SHORTNESS OF BREATH: 0
ABDOMINAL PAIN: 0
WOUND: 0
ARTHRALGIAS: 1
DIARRHEA: 1
LIGHT-HEADEDNESS: 0

## 2024-09-25 NOTE — PATIENT INSTRUCTIONS
Today :We administered vedolizumab (Entyvio) 300 mg in sodium chloride 0.9% 255 mL IV.     For:   1. Crohn's disease of small intestine with complication (Multi)         Your next appointment is due in:  8 WEEKS        Please read the  Medication Guide that was given to you and reviewed during todays visit.     (Tell all doctors including dentists that you are taking this medication)     Go to the emergency room or call 911 if:  -You have signs of allergic reaction:   -Rash, hives, itching.   -Swollen, blistered, peeling skin.   -Swelling of face, lips, mouth, tongue or throat.   -Tightness of chest, trouble breathing, swallowing or talking     Call your doctor:  - If IV / injection site gets red, warm, swollen, itchy or leaks fluid or pus.     (Leave dressing on your IV site for at least 2 hours and keep area clean and dry  - If you get sick or have symptoms of infection or are not feeling well for any reason.    (Wash your hands often, stay away from people who are sick)  - If you have side effects from your medication that do not go away or are bothersome.     (Refer to the teaching your nurse gave you for side effects to call your doctor about)    - Common side effects may include:  stuffy nose, headache, feeling tired, muscle aches, upset stomach  - Before receiving any vaccines     - Call [facility] at   If:  - You get sick, are on antibiotics, have had a recent vaccine, have surgery or dental work and your doctor wants your visit rescheduled.  - You need to cancel and reschedule your visit for any reason. Call at least 2 days before your visit if you need to cancel.   - Your insurance changes before your next visit.    (We will need to get approval from your new insurance. This can take up to two weeks.)     [facility] is opened Monday thru Friday. We are closed on weekends and holidays.   Voice mail will take your call if the center is closed. If you leave a message  please allow 24 hours for a call back during weekdays. If you leave a message on a weekend/holiday, we will call you back the next business day.

## 2024-09-25 NOTE — PROGRESS NOTES
[facility]   Infusion Clinic Note   Date: 2024   Name: Leni Valles  : 1995   MRN: [id number]          Reason for Visit: OP Infusion (PT HERE FOR Q56 DAY ENTYVIO 300 MG INFUSION)         Today: We administered vedolizumab (Entyvio) 300 mg in sodium chloride 0.9% 255 mL IV.       Visit Type: INFUSION       Ordered By: DR. QUEEN       Accompanied by:Parent       Diagnosis: Crohn's disease of small intestine with complication (Multi)        Allergies:   Allergies as of 2024 - Reviewed 2024   Allergen Reaction Noted    Lactose Unknown 2017          Current Medications has a current medication list which includes the following prescription(s): amitriptyline, dicyclomine, escitalopram, lactase/rennet, multivitamin, naproxen, vedolizumab, vedolizumab, and zinc gluconate.       Vitals:   Vitals:    24 0908 24 0931 24 0954   BP: 117/78 109/76 108/75   Pulse: 62 61 59   Resp: 16 16 18   Temp: 37 °C (98.6 °F) 36.8 °C (98.3 °F) 36.8 °C (98.2 °F)   TempSrc: Temporal     SpO2: 98% 99% 99%   Weight: 61.4 kg (135 lb 5.8 oz)               Infusion Pre-procedure Checklist:   - Allergies reviewed: yes   - Medications reviewed: yes       - Previous reaction to current treatment: no      Assess patient for the concerns below. Document provider notification as appropriate.  - Active or recent infection with/without current antibiotic use: no  - Recent or planned invasive dental work: no  - Recent or planned surgeries: no  - Recently received or plans to receive vaccinations: no  - Has treatment related toxicities: no  - Is pregnant:  no      Pain: 0   - Is the pain different from normal: n/a   - Is your Doctor aware:  n/a       Labs: N/A          Fall Risk Screening: Susy Fall Risk  History of Falling, Immediate or Within 3 Months: No  Secondary Diagnosis: No  Ambulatory Aid: Walks without aid/bedrest/nurse assist  Intravenous Therapy/Heparin Lock:  Yes  Gait/Transferring: Normal/bedrest/immobile  Mental Status: Oriented to own ability  Jain Fall Risk Score: 20       Review Of Systems:  Review of Systems   Constitutional:  Negative for appetite change, chills, fatigue, fever and unexpected weight change.   HENT:   Negative for hearing loss, mouth sores, sore throat, tinnitus, trouble swallowing and voice change.    Eyes:  Negative for eye problems.   Respiratory:  Negative for cough, shortness of breath and wheezing.    Cardiovascular:  Negative for chest pain, leg swelling and palpitations.   Gastrointestinal:  Positive for diarrhea. Negative for abdominal pain, blood in stool, constipation, nausea and vomiting.        DENIES ABDOMINAL PAIN, N/V  ADMITS TO APPROX. 3 STOOLS PER DAY, LOOSE IN CONSISTENCY  DENIES CONSTIPATION BUT ADMITS TO INTERMITTENT DIARRHEA  ADMITS TO MUCOUS IN THE STOOL BUT DENIES BLOOD  DENIES NOCTURNAL STOOLING   Genitourinary:  Negative for dysuria, frequency and hematuria.    Musculoskeletal:  Positive for arthralgias. Negative for myalgias.        R KNEE JOINT PAIN BASELINE, MD AWARE   Skin:  Negative for itching, rash and wound.   Neurological:  Negative for dizziness, extremity weakness, headaches, light-headedness and numbness.   Psychiatric/Behavioral:  Negative for depression. The patient is nervous/anxious.         MANAGING WITH MEDICATIONS         ROS completed? Yes      Infusion Readiness:  - Assessment Concerns Related to Infusion: No  - Provider notified: n/a      Document Below Only If Indicated:   New Patient Education:    N/A (returning patient for continuation of therapy. Ongoing education provided as needed.)        Treatment Conditions & Drug Specific Questions:    Vedolizumab  (ENTYVIO)    (Unless otherwise specified on patient specific therapy plan):     TREATMENT CONDITIONS:  Unless otherwise specified on patient specific therapy plan HOLD and notify provider prior to proceeding with treatment if:   o Positive  "Hepatitis B Surface Ag  o Positive T-Spot  o Patient has signs or symptoms of Progressive Multifocal Leukoencephalopath (PML)     No results found for: \"HAGCN\", \"HEPBSURABI\", \"HBSAG\", \"XHAGF\", \"HEPBSAG\", \"EXTHEPBSAG\", \"NONUHSWGH\"   No results found for: \"NONUHFIRE\", \"NONUHSWGH\", \"NONUHFISH\", \"EXTHEPBSAG\"  Lab Results   Component Value Date    TBSIN Negative 05/02/2022    TBGRES Negative  Reference range: NEGATIVE   06/24/2019      Lab Results   Component Value Date    ALT 14 04/25/2024    AST 14 04/25/2024    ALKPHOS 43 04/25/2024    BILITOT 0.7 04/25/2024        Labs reviewed and patient meets treatment conditions? Yes    DRUG SPECIFIC QUESTIONS:  Any signs or symptoms of Progressive Multifocal Leukoencephalopath (PML) which may include: memory loss, trouble thinking, loss of balance, difficulty talking or walking, loss of vision?  No    (If YES notify prescribing provider prior to proceeding)    REMINDERS:  Recommended Vitals/Observation:  Vitals: Monitor vitals at start of infusion, at 15 minutes and at completion of infusion.  Observation: First 3 infusions patient may leave 15 minutes post infusion. Subsequent maintenance infusions: if no reaction, may leave immediately post infusion.        Weight Based Drug Calculations:    WEIGHT BASED DRUGS: NOT APPLICABLE / FLAT DOSE          Initiated By: Rachel Anglin RN        "

## 2024-11-20 ENCOUNTER — APPOINTMENT (OUTPATIENT)
Dept: INFUSION THERAPY | Facility: CLINIC | Age: 29
End: 2024-11-20
Payer: COMMERCIAL

## 2024-11-20 VITALS
DIASTOLIC BLOOD PRESSURE: 86 MMHG | SYSTOLIC BLOOD PRESSURE: 121 MMHG | BODY MASS INDEX: 24.56 KG/M2 | OXYGEN SATURATION: 99 % | WEIGHT: 134.26 LBS | HEART RATE: 58 BPM | RESPIRATION RATE: 16 BRPM | TEMPERATURE: 99.3 F

## 2024-11-20 DIAGNOSIS — K50.019 CROHN'S DISEASE OF SMALL INTESTINE WITH COMPLICATION (MULTI): ICD-10-CM

## 2024-11-20 PROCEDURE — 96365 THER/PROPH/DIAG IV INF INIT: CPT | Performed by: NURSE PRACTITIONER

## 2024-11-20 RX ORDER — DIPHENHYDRAMINE HYDROCHLORIDE 50 MG/ML
50 INJECTION INTRAMUSCULAR; INTRAVENOUS AS NEEDED
OUTPATIENT
Start: 2025-01-15

## 2024-11-20 RX ORDER — ALBUTEROL SULFATE 0.83 MG/ML
3 SOLUTION RESPIRATORY (INHALATION) AS NEEDED
OUTPATIENT
Start: 2025-01-15

## 2024-11-20 RX ORDER — EPINEPHRINE 0.3 MG/.3ML
0.3 INJECTION SUBCUTANEOUS EVERY 5 MIN PRN
OUTPATIENT
Start: 2025-01-15

## 2024-11-20 RX ORDER — FAMOTIDINE 10 MG/ML
20 INJECTION INTRAVENOUS ONCE AS NEEDED
OUTPATIENT
Start: 2025-01-15

## 2024-11-20 ASSESSMENT — ENCOUNTER SYMPTOMS
SHORTNESS OF BREATH: 0
WHEEZING: 0
COUGH: 0
SORE THROAT: 0
CONSTIPATION: 1
BLOOD IN STOOL: 0
FREQUENCY: 0
FEVER: 0
ARTHRALGIAS: 1
NUMBNESS: 0
NERVOUS/ANXIOUS: 1
NAUSEA: 1
MYALGIAS: 0
LEG SWELLING: 0
EYE PROBLEMS: 0
LIGHT-HEADEDNESS: 0
VOICE CHANGE: 0
DYSURIA: 0
UNEXPECTED WEIGHT CHANGE: 0
CHILLS: 0
APPETITE CHANGE: 0
EXTREMITY WEAKNESS: 0
DIARRHEA: 1
HEMATURIA: 0
DEPRESSION: 0
TROUBLE SWALLOWING: 0
FATIGUE: 1
PALPITATIONS: 0
DIZZINESS: 0
ABDOMINAL PAIN: 1
HEADACHES: 0
VOMITING: 0
WOUND: 0

## 2024-11-20 NOTE — PATIENT INSTRUCTIONS
Today :We administered vedolizumab (Entyvio) 300 mg in sodium chloride 0.9% 255 mL IV.     For:   1. Crohn's disease of small intestine with complication (Multi)         Your next appointment is due in:  8 WEEKS        Please read the  Medication Guide that was given to you and reviewed during todays visit.     (Tell all doctors including dentists that you are taking this medication)     Go to the emergency room or call 911 if:  -You have signs of allergic reaction:   -Rash, hives, itching.   -Swollen, blistered, peeling skin.   -Swelling of face, lips, mouth, tongue or throat.   -Tightness of chest, trouble breathing, swallowing or talking     Call your doctor:  - If IV / injection site gets red, warm, swollen, itchy or leaks fluid or pus.     (Leave dressing on your IV site for at least 2 hours and keep area clean and dry  - If you get sick or have symptoms of infection or are not feeling well for any reason.    (Wash your hands often, stay away from people who are sick)  - If you have side effects from your medication that do not go away or are bothersome.     (Refer to the teaching your nurse gave you for side effects to call your doctor about)    - Common side effects may include:  stuffy nose, headache, feeling tired, muscle aches, upset stomach  - Before receiving any vaccines     - [facility] at   If:  - You get sick, are on antibiotics, have had a recent vaccine, have surgery or dental work and your doctor wants your visit rescheduled.  - You need to cancel and reschedule your visit for any reason. Call at least 2 days before your visit if you need to cancel.   - Your insurance changes before your next visit.    (We will need to get approval from your new insurance. This can take up to two weeks.)     [facility] is opened Monday thru Friday. We are closed on weekends and holidays.   Voice mail will take your call if the center is closed. If you leave a message  please allow 24 hours for a call back during weekdays. If you leave a message on a weekend/holiday, we will call you back the next business day.    A pharmacist is available Monday - Friday from 8:30AM to 3:30PM to help answer any questions you may have about your prescriptions(s). Please call pharmacy at:    [facility]: [phone]  [facility]: [phone]  [facility]: [phone]

## 2024-11-20 NOTE — PROGRESS NOTES
[facility]   Infusion Clinic Note   Date: 2024   Name: Leni Valles  : 1995   MRN: [id number]          Reason for Visit: OP Infusion and Follow-up (PT HERE FOR Q56 DAY ENTYVIO 300 MG INFUSION)         Today: We administered vedolizumab (Entyvio) 300 mg in sodium chloride 0.9% 255 mL IV.       Visit Type: INFUSION       Ordered By: DR. QUEEN       Accompanied by:Parent       Diagnosis: Crohn's disease of small intestine with complication (Multi)        Allergies:   Allergies as of 2024 - Reviewed 2024   Allergen Reaction Noted    Lactose Unknown 2017          Current Medications has a current medication list which includes the following prescription(s): amitriptyline, escitalopram, lactase/rennet, multivitamin, naproxen, vedolizumab, vedolizumab, and zinc gluconate.       Vitals:   Vitals:    24 1005 24 1045 24 1105   BP: 109/75 114/81 121/86   Pulse: 65 64 58   Resp: 16 16 16   Temp: 36.8 °C (98.2 °F) 37.7 °C (99.9 °F) 37.4 °C (99.3 °F)   SpO2: 99% 99% 99%   Weight: 60.9 kg (134 lb 4.2 oz)               Infusion Pre-procedure Checklist:   - Allergies reviewed: yes   - Medications reviewed: yes       - Previous reaction to current treatment: no      Assess patient for the concerns below. Document provider notification as appropriate.  - Active or recent infection with/without current antibiotic use: no  - Recent or planned invasive dental work: no  - Recent or planned surgeries: no  - Recently received or plans to receive vaccinations: no  - Has treatment related toxicities: no  - Is pregnant:  no      Pain: 0   - Is the pain different from normal: n/a   - Is your Doctor aware:  n/a       Labs: N/A          Fall Risk Screening: Susy Fall Risk  History of Falling, Immediate or Within 3 Months: No  Secondary Diagnosis: No  Ambulatory Aid: Walks without aid/bedrest/nurse assist  Intravenous Therapy/Heparin Lock: Yes  Gait/Transferring:  Normal/bedrest/immobile  Mental Status: Oriented to own ability  Jain Fall Risk Score: 20       Review Of Systems:  Review of Systems   Constitutional:  Positive for fatigue. Negative for appetite change, chills, fever and unexpected weight change.        BASELINE MILD FATIGUE   HENT:   Negative for hearing loss, mouth sores, sore throat, tinnitus, trouble swallowing and voice change.    Eyes:  Negative for eye problems.   Respiratory:  Negative for cough, shortness of breath and wheezing.    Cardiovascular:  Negative for chest pain, leg swelling and palpitations.   Gastrointestinal:  Positive for abdominal pain, constipation, diarrhea and nausea. Negative for blood in stool and vomiting.        ADMITS TO INTERMITTENT ABDOMINAL PAIN AND NAUSEA BUT DENIES VOMITING  ADMITS TO APPROX. 2-3 STOOLS, FORMED IN CONSISTENCY  ADMITS TO MUCOUS IN THE STOOL BUT DENIES BLOOD  ADMITS TO INTERMITTENT DIARRHEA AND CONSTIPATION  DENIES NOCTURNAL STOOLING   Genitourinary:  Negative for dysuria, frequency and hematuria.    Musculoskeletal:  Positive for arthralgias. Negative for myalgias.        R KNEE AND BOTH WRISTS   Skin:  Negative for itching, rash and wound.   Neurological:  Negative for dizziness, extremity weakness, headaches, light-headedness and numbness.   Psychiatric/Behavioral:  Negative for depression. The patient is nervous/anxious.         MANAGING WITH MEDICATIONS         ROS completed? Yes      Infusion Readiness:  - Assessment Concerns Related to Infusion: No  - Provider notified: n/a      Document Below Only If Indicated:   New Patient Education:    N/A (returning patient for continuation of therapy. Ongoing education provided as needed.)        Treatment Conditions & Drug Specific Questions:    Vedolizumab  (ENTYVIO)    (Unless otherwise specified on patient specific therapy plan):     TREATMENT CONDITIONS:  Unless otherwise specified on patient specific therapy plan HOLD and notify provider prior to proceeding  "with treatment if:   o Positive Hepatitis B Surface Ag  o Positive T-Spot  o Patient has signs or symptoms of Progressive Multifocal Leukoencephalopath (PML)     No results found for: \"HAGCN\", \"HEPBSURABI\", \"HBSAG\", \"XHAGF\", \"HEPBSAG\", \"EXTHEPBSAG\", \"NONUHSWGH\"   No results found for: \"NONUHFIRE\", \"NONUHSWGH\", \"NONUHFISH\", \"EXTHEPBSAG\"  Lab Results   Component Value Date    TBSIN Negative 05/02/2022    TBGRES Negative  Reference range: NEGATIVE   06/24/2019      Lab Results   Component Value Date    ALT 14 04/25/2024    AST 14 04/25/2024    ALKPHOS 43 04/25/2024    BILITOT 0.7 04/25/2024        Labs reviewed and patient meets treatment conditions? Yes    DRUG SPECIFIC QUESTIONS:  Any signs or symptoms of Progressive Multifocal Leukoencephalopath (PML) which may include: memory loss, trouble thinking, loss of balance, difficulty talking or walking, loss of vision?  No    (If YES notify prescribing provider prior to proceeding)    REMINDERS:  Recommended Vitals/Observation:  Vitals: Monitor vitals at start of infusion, at 15 minutes and at completion of infusion.  Observation: First 3 infusions patient may leave 15 minutes post infusion. Subsequent maintenance infusions: if no reaction, may leave immediately post infusion.        Weight Based Drug Calculations:    WEIGHT BASED DRUGS: NOT APPLICABLE / FLAT DOSE          Initiated By: Rachel Anglin RN        "

## 2025-01-15 ENCOUNTER — APPOINTMENT (OUTPATIENT)
Dept: INFUSION THERAPY | Facility: CLINIC | Age: 30
End: 2025-01-15
Payer: COMMERCIAL

## 2025-01-15 VITALS
DIASTOLIC BLOOD PRESSURE: 75 MMHG | HEART RATE: 56 BPM | SYSTOLIC BLOOD PRESSURE: 108 MMHG | WEIGHT: 130.73 LBS | BODY MASS INDEX: 23.91 KG/M2 | TEMPERATURE: 97.3 F | RESPIRATION RATE: 16 BRPM | OXYGEN SATURATION: 98 %

## 2025-01-15 DIAGNOSIS — F41.9 ANXIETY: ICD-10-CM

## 2025-01-15 DIAGNOSIS — K50.019 CROHN'S DISEASE OF SMALL INTESTINE WITH COMPLICATION (MULTI): ICD-10-CM

## 2025-01-15 PROCEDURE — 96365 THER/PROPH/DIAG IV INF INIT: CPT | Performed by: NURSE PRACTITIONER

## 2025-01-15 RX ORDER — ALBUTEROL SULFATE 0.83 MG/ML
3 SOLUTION RESPIRATORY (INHALATION) AS NEEDED
OUTPATIENT
Start: 2025-03-12

## 2025-01-15 RX ORDER — FAMOTIDINE 10 MG/ML
20 INJECTION INTRAVENOUS ONCE AS NEEDED
OUTPATIENT
Start: 2025-03-12

## 2025-01-15 RX ORDER — DIPHENHYDRAMINE HYDROCHLORIDE 50 MG/ML
50 INJECTION INTRAMUSCULAR; INTRAVENOUS AS NEEDED
OUTPATIENT
Start: 2025-03-12

## 2025-01-15 RX ORDER — EPINEPHRINE 0.3 MG/.3ML
0.3 INJECTION SUBCUTANEOUS EVERY 5 MIN PRN
OUTPATIENT
Start: 2025-03-12

## 2025-01-15 RX ORDER — ESCITALOPRAM OXALATE 10 MG/1
15 TABLET ORAL DAILY
Qty: 135 TABLET | Refills: 3 | Status: SHIPPED | OUTPATIENT
Start: 2025-01-15

## 2025-01-15 ASSESSMENT — ENCOUNTER SYMPTOMS
MYALGIAS: 0
UNEXPECTED WEIGHT CHANGE: 0
DEPRESSION: 0
SHORTNESS OF BREATH: 0
VOICE CHANGE: 0
APPETITE CHANGE: 0
VOMITING: 0
ARTHRALGIAS: 0
ABDOMINAL PAIN: 0
LIGHT-HEADEDNESS: 0
FEVER: 0
FATIGUE: 0
EXTREMITY WEAKNESS: 0
CONFUSION: 0
WOUND: 0
COUGH: 0
NAUSEA: 1
NERVOUS/ANXIOUS: 1
HEMATURIA: 0
BLOOD IN STOOL: 0
NUMBNESS: 0
HEADACHES: 0
LEG SWELLING: 0
SORE THROAT: 0
DYSURIA: 0
TROUBLE SWALLOWING: 0
EYE PROBLEMS: 0
CONSTIPATION: 0
FREQUENCY: 0
DIZZINESS: 0
PALPITATIONS: 0
CHILLS: 0
DIARRHEA: 1
WHEEZING: 0

## 2025-01-15 NOTE — PATIENT INSTRUCTIONS
Today :We administered vedolizumab (Entyvio) 300 mg in sodium chloride 0.9% 255 mL IV.     For:   1. Crohn's disease of small intestine with complication (Multi)         Your next appointment is due in:  56 DAYS         Please read the  Medication Guide that was given to you and reviewed during todays visit.     (Tell all doctors including dentists that you are taking this medication)     Go to the emergency room or call 911 if:  -You have signs of allergic reaction:   -Rash, hives, itching.   -Swollen, blistered, peeling skin.   -Swelling of face, lips, mouth, tongue or throat.   -Tightness of chest, trouble breathing, swallowing or talking     Call your doctor:  - If IV / injection site gets red, warm, swollen, itchy or leaks fluid or pus.     (Leave dressing on your IV site for at least 2 hours and keep area clean and dry  - If you get sick or have symptoms of infection or are not feeling well for any reason.    (Wash your hands often, stay away from people who are sick)  - If you have side effects from your medication that do not go away or are bothersome.     (Refer to the teaching your nurse gave you for side effects to call your doctor about)    - Common side effects may include:  stuffy nose, headache, feeling tired, muscle aches, upset stomach  - Before receiving any vaccines     - Call the [facility] at   If:  - You get sick, are on antibiotics, have had a recent vaccine, have surgery or dental work and your doctor wants your visit rescheduled.  - You need to cancel and reschedule your visit for any reason. Call at least 2 days before your visit if you need to cancel.   - Your insurance changes before your next visit.    (We will need to get approval from your new insurance. This can take up to two weeks.)     [facility] is opened Monday thru Friday. We are closed on weekends and holidays.   Voice mail will take your call if the center is closed. If you leave a message  please allow 24 hours for a call back during weekdays. If you leave a message on a weekend/holiday, we will call you back the next business day.    A pharmacist is available Monday - Friday from 8:30AM to 3:30PM to help answer any questions you may have about your prescriptions(s). Please call pharmacy at:    [facility]: [phone]  [facility]: [phone]  [facility]: [phone]

## 2025-02-13 ENCOUNTER — PATIENT MESSAGE (OUTPATIENT)
Dept: PRIMARY CARE | Facility: CLINIC | Age: 30
End: 2025-02-13
Payer: COMMERCIAL

## 2025-02-13 DIAGNOSIS — F41.9 ANXIETY: ICD-10-CM

## 2025-02-16 RX ORDER — ESCITALOPRAM OXALATE 10 MG/1
15 TABLET ORAL DAILY
Qty: 135 TABLET | Refills: 3 | Status: SHIPPED | OUTPATIENT
Start: 2025-02-16

## 2025-02-24 ENCOUNTER — PATIENT MESSAGE (OUTPATIENT)
Dept: GASTROENTEROLOGY | Facility: HOSPITAL | Age: 30
End: 2025-02-24
Payer: COMMERCIAL

## 2025-02-24 DIAGNOSIS — R10.33 PERIUMBILICAL ABDOMINAL PAIN: Primary | ICD-10-CM

## 2025-02-25 RX ORDER — DICYCLOMINE HYDROCHLORIDE 10 MG/1
10 CAPSULE ORAL EVERY 8 HOURS PRN
Qty: 60 CAPSULE | Refills: 1 | Status: SHIPPED | OUTPATIENT
Start: 2025-02-25

## 2025-03-12 ENCOUNTER — APPOINTMENT (OUTPATIENT)
Dept: INFUSION THERAPY | Facility: CLINIC | Age: 30
End: 2025-03-12
Payer: COMMERCIAL

## 2025-03-12 ENCOUNTER — OFFICE VISIT (OUTPATIENT)
Dept: GASTROENTEROLOGY | Facility: HOSPITAL | Age: 30
End: 2025-03-12
Payer: COMMERCIAL

## 2025-03-12 VITALS
OXYGEN SATURATION: 98 % | BODY MASS INDEX: 23.05 KG/M2 | DIASTOLIC BLOOD PRESSURE: 70 MMHG | RESPIRATION RATE: 18 BRPM | WEIGHT: 126 LBS | SYSTOLIC BLOOD PRESSURE: 115 MMHG | TEMPERATURE: 98.2 F | HEART RATE: 67 BPM

## 2025-03-12 VITALS
RESPIRATION RATE: 16 BRPM | SYSTOLIC BLOOD PRESSURE: 105 MMHG | HEART RATE: 70 BPM | OXYGEN SATURATION: 98 % | DIASTOLIC BLOOD PRESSURE: 71 MMHG | TEMPERATURE: 97.2 F

## 2025-03-12 DIAGNOSIS — R10.30 LOWER ABDOMINAL PAIN: ICD-10-CM

## 2025-03-12 DIAGNOSIS — K50.019 CROHN'S DISEASE OF SMALL INTESTINE WITH COMPLICATION (MULTI): Primary | ICD-10-CM

## 2025-03-12 PROCEDURE — 99214 OFFICE O/P EST MOD 30 MIN: CPT | Mod: GC | Performed by: INTERNAL MEDICINE

## 2025-03-12 PROCEDURE — 99214 OFFICE O/P EST MOD 30 MIN: CPT | Performed by: INTERNAL MEDICINE

## 2025-03-12 PROCEDURE — 96365 THER/PROPH/DIAG IV INF INIT: CPT | Performed by: NURSE PRACTITIONER

## 2025-03-12 RX ORDER — EPINEPHRINE 0.3 MG/.3ML
0.3 INJECTION SUBCUTANEOUS EVERY 5 MIN PRN
OUTPATIENT
Start: 2025-05-07

## 2025-03-12 RX ORDER — ALBUTEROL SULFATE 0.83 MG/ML
3 SOLUTION RESPIRATORY (INHALATION) AS NEEDED
OUTPATIENT
Start: 2025-05-07

## 2025-03-12 RX ORDER — FAMOTIDINE 10 MG/ML
20 INJECTION, SOLUTION INTRAVENOUS ONCE AS NEEDED
OUTPATIENT
Start: 2025-05-07

## 2025-03-12 RX ORDER — DIPHENHYDRAMINE HYDROCHLORIDE 50 MG/ML
50 INJECTION INTRAMUSCULAR; INTRAVENOUS AS NEEDED
OUTPATIENT
Start: 2025-05-07

## 2025-03-12 ASSESSMENT — ENCOUNTER SYMPTOMS
PALPITATIONS: 0
CHILLS: 0
NAUSEA: 1
TROUBLE SWALLOWING: 0
VOMITING: 0
CHILLS: 0
UNEXPECTED WEIGHT CHANGE: 0
EXTREMITY WEAKNESS: 0
CONSTIPATION: 0
BLOOD IN STOOL: 0
LIGHT-HEADEDNESS: 0
EYE PAIN: 0
WHEEZING: 0
EYE PROBLEMS: 0
MYALGIAS: 0
ROS GI COMMENTS: SEE HPI
SHORTNESS OF BREATH: 0
ABDOMINAL PAIN: 0
HEMATURIA: 0
FATIGUE: 1
DIZZINESS: 0
ARTHRALGIAS: 1
APPETITE CHANGE: 0
COUGH: 0
WEAKNESS: 0
UNEXPECTED WEIGHT CHANGE: 0
LEG SWELLING: 0
VOICE CHANGE: 0
FEVER: 0
SORE THROAT: 0
SHORTNESS OF BREATH: 0
FREQUENCY: 0
DYSURIA: 0
ABDOMINAL DISTENTION: 0
NUMBNESS: 0
DIARRHEA: 0
FEVER: 0
WOUND: 0
ARTHRALGIAS: 0
HEADACHES: 0

## 2025-03-12 ASSESSMENT — PAIN SCALES - GENERAL: PAINLEVEL_OUTOF10: 3

## 2025-03-12 NOTE — PATIENT INSTRUCTIONS
Today :Leni Valles had no medications administered during this visit.     For:   1. Crohn's disease of small intestine with complication (Multi)         Your next appointment is due in:  56 DAYS         Please read the  Medication Guide that was given to you and reviewed during todays visit.     (Tell all doctors including dentists that you are taking this medication)     Go to the emergency room or call 911 if:  -You have signs of allergic reaction:   -Rash, hives, itching.   -Swollen, blistered, peeling skin.   -Swelling of face, lips, mouth, tongue or throat.   -Tightness of chest, trouble breathing, swallowing or talking     Call your doctor:  - If IV / injection site gets red, warm, swollen, itchy or leaks fluid or pus.     (Leave dressing on your IV site for at least 2 hours and keep area clean and dry  - If you get sick or have symptoms of infection or are not feeling well for any reason.    (Wash your hands often, stay away from people who are sick)  - If you have side effects from your medication that do not go away or are bothersome.     (Refer to the teaching your nurse gave you for side effects to call your doctor about)    - Common side effects may include:  stuffy nose, headache, feeling tired, muscle aches, upset stomach  - Before receiving any vaccines     - Call the [facility] at   If:  - You get sick, are on antibiotics, have had a recent vaccine, have surgery or dental work and your doctor wants your visit rescheduled.  - You need to cancel and reschedule your visit for any reason. Call at least 2 days before your visit if you need to cancel.   - Your insurance changes before your next visit.    (We will need to get approval from your new insurance. This can take up to two weeks.)     [facility] is opened Monday thru Friday. We are closed on weekends and holidays.   Voice mail will take your call if the center is closed. If you leave a message please  allow 24 hours for a call back during weekdays. If you leave a message on a weekend/holiday, we will call you back the next business day.    A pharmacist is available Monday - Friday from 8:30AM to 3:30PM to help answer any questions you may have about your prescriptions(s). Please call pharmacy at:    [facility]: [phone]  [facility]: [phone]  [facility]: [phone]

## 2025-03-12 NOTE — PATIENT INSTRUCTIONS
Labs ordered: CBC, CMP, CRP, and fecal calprotectin.  Schedule for colonoscopy.  If evidence of luminal inflammation, will consider increasing Entyvio to every 6 weeks.  If no evidence of inflammation, we will add low dose nortriptyline.  Continue taking dicyclomine as needed.  Follow-up in 4 months.

## 2025-03-12 NOTE — PROGRESS NOTES
[facility]   Infusion Clinic Note   Date: 2025   Name: Leni Valles  : 1995   MRN: [id number]         Reason for Visit: OP Infusion (ENTYVIO 300MG EVERY 56 DAYS )         Today: We administered vedolizumab (Entyvio) 300 mg in sodium chloride 0.9% 255 mL IV.       Ordered By: Gordon Toussaint MD       For a Diagnosis of: Crohn's disease of small intestine with complication (Multi)       At today's visit patient accompanied by: Significant Other      Today's Vitals:   Vitals:    25 1127 25 1132   BP: 98/67  Comment: nurse notified   Comment: retook vitals   Pulse: 73 70   Resp: 16    Temp: 36.2 °C (97.2 °F)    SpO2: 98%              Pre - Treatment Checklist:      - Previous reaction to current treatment: no      (Assess patient for the concerns below. Document provider notification as appropriate).  - Active or recent infection with/without current antibiotic use: no  - Recent or planned invasive dental work: no  - Recent or planned surgeries: no  - Recently received or plans to receive vaccinations: no  - Has treatment related toxicities: no  - Any chance may be pregnant:  no      Pain: 0   - Is the pain different from normal: n/a   - Is prescribing Doctor aware:  n/a      Labs: Labs drawn and sent per order      Fall Risk Screening: Jain Fall Risk  History of Falling, Immediate or Within 3 Months: No  Secondary Diagnosis: No  Ambulatory Aid: Walks without aid/bedrest/nurse assist  Intravenous Therapy/Heparin Lock: Yes  Gait/Transferring: Normal/bedrest/immobile  Mental Status: Oriented to own ability  Jain Fall Risk Score: 20       Review Of Systems:  Review of Systems   Constitutional:  Positive for fatigue. Negative for appetite change, chills, fever and unexpected weight change.   HENT:   Negative for hearing loss, mouth sores, sore throat, tinnitus, trouble swallowing and voice change.    Eyes:  Negative for eye problems.   Respiratory:  Negative for  cough, shortness of breath and wheezing.    Cardiovascular:  Negative for chest pain, leg swelling and palpitations.   Gastrointestinal:  Positive for nausea. Negative for abdominal distention, abdominal pain, blood in stool, constipation, diarrhea and vomiting.        PT WITH A DX OF IBD REPORTS #  3 LOOSE  BM'S PER DAY. reports NOTING MUCUS TO STOOLS.  reports C/O OF ABDOMINAL PAIN AND BOUTS OF NOCTURNAL STOOLING AT TIMES.     Genitourinary:  Negative for dysuria, frequency and hematuria.    Musculoskeletal:  Negative for arthralgias and myalgias.   Skin:  Negative for itching, rash and wound.   Neurological:  Negative for dizziness, extremity weakness, headaches, light-headedness and numbness.         Infusion Readiness:  - Assessment Concerns Related to Infusion: No  - Provider notified: n/a      New Patient Education:    N/A (returning patient for continuation of therapy. Ongoing education provided as needed.)        Treatment Conditions & Drug Specific Questions:    Vedolizumab  (ENTYVIO)    (Unless otherwise specified on patient specific therapy plan):     TREATMENT CONDITIONS:  Unless otherwise specified on patient specific therapy plan HOLD and notify provider prior to proceeding with treatment if:   o Positive T-Spot  o Patient has signs or symptoms of Progressive Multifocal Leukoencephalopath (PML)     Lab Results   Component Value Date    TBSIN Negative 05/02/2022    TBGRES Negative  Reference range: NEGATIVE   06/24/2019      Lab Results   Component Value Date    ALT 14 04/25/2024    AST 14 04/25/2024    ALKPHOS 43 04/25/2024    BILITOT 0.7 04/25/2024      Patient meets treatment conditions? Yes    DRUG SPECIFIC QUESTIONS:  Any signs or symptoms of Progressive Multifocal Leukoencephalopath (PML) which may include: memory loss, trouble thinking, loss of balance, difficulty talking or walking, loss of vision?  No    (If YES notify prescribing provider prior to proceeding)    REMINDERS:  Recommended  Vitals/Observation:  Vitals: Monitor vitals at start of infusion and at completion of infusion.  Observation: First 3 infusions patient may leave 15 minutes post infusion. Subsequent maintenance infusions: if no reaction, may leave immediately post infusion.        Weight Based Drug Calculations:    WEIGHT BASED DRUGS: NOT APPLICABLE / FLAT DOSE       Post Treatment: Patient tolerated treatment without issue and was discharged in no apparent distress.      Note Authored / Patient Cared for By: PAT Gongora-CNP

## 2025-03-12 NOTE — PROGRESS NOTES
Subjective     History of Present Illness:   Leni Valles is a 29 y.o. female  with Crohn's disease s/p ileocolectomy in  who presented to GI clinic for follow-up.      During her previous visit in 2024, patient complained of intermittent abdominal pain.  She was scheduled for colonoscopy and prescribed 25 mg amitriptyline daily.  The amitriptyline helped her abdominal pain but she has side effects of nausea and dizziness so she discontinued amitriptyline.  The colonoscopy did not occur.    Currently, she reports having intermittent abdominal pain, worse about 1 week prior to her Entyvio infusion.  She reports having 3-4 loose Bms daily with nocturnal BM occurring about once per week.  She reports having pain in her hands.  She takes dicyclomine as needed with helps her pain.      Luis Carlos-Liu Index  General well bein (1: slightly below par)  Abdominal pain: 2 (2: moderate)  # of liquid stools: 3  Abdominal mass: 0 (0: none)  Complications: 1 (+1 for: arthralgia,)    Total Score: 7 (5-7: mild disease)      IBD History  Patient was diagnosed with Crohn's disease at age 13. Her symptoms at the time were abdominal pain and constipation. She underwent EGD and colonoscopy in  with Dr. Oconnell at [facility] which reportedly showed ileitis. Patient was initially placed on Pentasa. She later switched her care to Dr. Bansal at [facility] at age 15 and continued on Pentasa. 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 She underwent ileocolonic resection in 2012 and reportedly had one foot of ileum resected. She resumed Pentasa and added mercaptopurine 75 mg daily post surgery.     She transitioned to adult GI at [facility] with Dr. Gee in  and continued mercaptopurine and Pentasa. Patient had colonoscopy in 2021 which reportedly showed minimal ileal erosions. Pathology showed patchy ileitis and normal colonic biopsies. MRE 21  showed no evidence of active CD. She experienced symptoms of weight loss, intermittent abdominal pain, and diarrhea in 2020. In November 2021, patient was placed on vedolizumab infusions. Pentasa & mercaptopurine were discontinued in March 2022.     Patient established care with our clinic in May 2022. She complained of having diarrhea and abdominal pain with food intake then. She underwent diagnostic EGD and colonoscopy on 7/8/22 which showed 1 cm hiatal hernia, normal hafsa-TI, and colon. Capsule endoscopy on 8/22/22 showed normal small bowel. She did well until summer 2023 when she reported experiencing epigastric, RLQ pain, decreased appetite, and increase stool frequency.  These symptoms occured after she missed her Entyvio dose in August 2023 which was resumed in October 2023.      Review of Systems  Review of Systems   Constitutional:  Negative for chills, fever and unexpected weight change.   HENT:  Negative for mouth sores.    Eyes:  Negative for pain.   Respiratory:  Negative for shortness of breath.    Cardiovascular:  Negative for chest pain.   Gastrointestinal:         See HPI   Musculoskeletal:  Positive for arthralgias.   Skin:  Negative for rash.   Neurological:  Negative for weakness.       Past Medical History   has a past medical history of Crohn's colitis (Multi) and Other conditions influencing health status.     Social History   reports that she has never smoked. She has never used smokeless tobacco. She reports current alcohol use of about 1.0 standard drink of alcohol per week. She reports that she does not use drugs.   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    Family History  family history includes Breast cancer (age of onset: 40) in her mother; Hypertension in her father; Leukemia in her maternal grandfather; Lung cancer in her maternal grandmother; Ovarian cancer in her mother; Skin cancer in her father; Uterine cancer (age of onset: 58) in her mother.     Allergies  Allergies   Allergen  Reactions    Lactose Unknown       Medications  Current Outpatient Medications   Medication Instructions    amitriptyline (ELAVIL) 25 mg, oral, Daily    dicyclomine (BENTYL) 10 mg, oral, Every 8 hours PRN    escitalopram (LEXAPRO) 15 mg, oral, Daily    LACTASE-RENNET ORAL 1 tablet, 3 times daily (morning, midday, late afternoon)    multivitamin capsule 1 capsule, Daily    naproxen (NAPROSYN) 500 mg, Every 12 hours PRN    vedolizumab (ENTYVIO) 300 mg, intravenous, Every 8 weeks    vedolizumab (ENTYVIO) 300 mg, intravenous, Once, For Maintenance: every 8 weeks        Objective   Visit Vitals  /70 (BP Location: Left arm, Patient Position: Sitting, BP Cuff Size: Adult)   Pulse 67   Temp 36.8 °C (98.2 °F) (Temporal)   Resp 18      Body mass index is 23.05 kg/m².  Physical Exam  Constitutional:       Appearance: Normal appearance.   HENT:      Head: Normocephalic and atraumatic.   Eyes:      General: No scleral icterus.  Cardiovascular:      Rate and Rhythm: Normal rate and regular rhythm.   Pulmonary:      Effort: Pulmonary effort is normal.      Breath sounds: Normal breath sounds. No wheezing.   Abdominal:      General: Bowel sounds are normal.      Palpations: Abdomen is soft. There is no mass.      Tenderness: There is abdominal tenderness. There is no guarding or rebound.      Comments: Tenderness to palpation in lower abdomen to palpation   Musculoskeletal:         General: Normal range of motion.      Cervical back: Normal range of motion.   Skin:     Coloration: Skin is not jaundiced.   Neurological:      Mental Status: She is alert and oriented to person, place, and time.         Labs  Lab Results   Component Value Date    WBC 6.4 04/25/2024    HGB 13.7 04/25/2024    HCT 40.9 04/25/2024    MCV 83 04/25/2024     04/25/2024     Lab Results   Component Value Date    GLUCOSE 60 (L) 04/25/2024    CALCIUM 9.0 04/25/2024     04/25/2024    K 4.5 04/25/2024    CO2 29 04/25/2024     04/25/2024     BUN 13 04/25/2024    CREATININE 0.76 04/25/2024     Lab Results   Component Value Date    ALT 14 04/25/2024    AST 14 04/25/2024    ALKPHOS 43 04/25/2024    BILITOT 0.7 04/25/2024     Lab Results   Component Value Date    CRP <0.10 04/25/2024     Lab Results   Component Value Date    CALPS 20 04/05/2023    CALPS 11 05/13/2022       Assessment/Plan   Leni Valles is a 29 y.o. female  with Crohn's disease s/p ileocolectomy in 2012 who presented to GI clinic for follow-up.  Patient continues to take vedolizumab.  She reports having intermittent abdominal pain that is worsened about a week prior to her next infusion.  She has chronic abdominal pain with negative prior endoscopic evaluation so some component may be due to superimposed IBS.  We will reassess with inflammatory markers.  If her inflammatory markers are elevated, we will plan to increase Entyvio dose to every 6 weeks.  If her inflammatory markers are negative, we will consider starting patient on low dose nortriptyline.  Plan for colonoscopy.    Crohn's disease:  Age at diagnosis: 13   Disease Extent: ileal disease  Disease Behavior: stricturing  Current symptoms: intermittent abdominal pain  Prior Medications: 6-MP 75 mg daily, Pentasa  Active Medications: vedolizumab every 8 weeks  Last endoscopy: EGD and colonoscopy on 7/8/22 showed 1 cm hiatal hernia, normal hafsa-TI, and colon. Capsule endoscopy on 8/22/22 showed normal small bowel  Last imaging studies: MRE on 8/18/21 showed no evidence of active CD  EIMs: possible mild peripheral spondyloarthropathy in hands      Patient Instructions  Labs ordered: CBC, CMP, CRP, and fecal calprotectin.  Schedule for colonoscopy.  If evidence of luminal inflammation, will consider increasing Entyvio to every 6 weeks.  If no evidence of inflammation, we will add low dose nortriptyline.  Continue taking dicyclomine as needed.  Follow-up in 4 months.    Florence Russell MD, Fellow       Gordon Toussaint MD, Attending

## 2025-03-13 LAB
ALBUMIN SERPL-MCNC: 4.7 G/DL (ref 3.6–5.1)
ALP SERPL-CCNC: 45 U/L (ref 31–125)
ALT SERPL-CCNC: 15 U/L (ref 6–29)
ANION GAP SERPL CALCULATED.4IONS-SCNC: 8 MMOL/L (CALC) (ref 7–17)
AST SERPL-CCNC: 16 U/L (ref 10–30)
BASOPHILS # BLD AUTO: 50 CELLS/UL (ref 0–200)
BASOPHILS NFR BLD AUTO: 1 %
BILIRUB SERPL-MCNC: 0.7 MG/DL (ref 0.2–1.2)
BUN SERPL-MCNC: 11 MG/DL (ref 7–25)
CALCIUM SERPL-MCNC: 9.5 MG/DL (ref 8.6–10.2)
CHLORIDE SERPL-SCNC: 103 MMOL/L (ref 98–110)
CO2 SERPL-SCNC: 29 MMOL/L (ref 20–32)
CREAT SERPL-MCNC: 0.61 MG/DL (ref 0.5–0.96)
CRP SERPL-MCNC: <3 MG/L
EGFRCR SERPLBLD CKD-EPI 2021: 125 ML/MIN/1.73M2
EOSINOPHIL # BLD AUTO: 110 CELLS/UL (ref 15–500)
EOSINOPHIL NFR BLD AUTO: 2.2 %
ERYTHROCYTE [DISTWIDTH] IN BLOOD BY AUTOMATED COUNT: 12 % (ref 11–15)
GLUCOSE SERPL-MCNC: 78 MG/DL (ref 65–99)
HCT VFR BLD AUTO: 41.7 % (ref 35–45)
HGB BLD-MCNC: 13.5 G/DL (ref 11.7–15.5)
LYMPHOCYTES # BLD AUTO: 1430 CELLS/UL (ref 850–3900)
LYMPHOCYTES NFR BLD AUTO: 28.6 %
MCH RBC QN AUTO: 28.1 PG (ref 27–33)
MCHC RBC AUTO-ENTMCNC: 32.4 G/DL (ref 32–36)
MCV RBC AUTO: 86.7 FL (ref 80–100)
MONOCYTES # BLD AUTO: 475 CELLS/UL (ref 200–950)
MONOCYTES NFR BLD AUTO: 9.5 %
NEUTROPHILS # BLD AUTO: 2935 CELLS/UL (ref 1500–7800)
NEUTROPHILS NFR BLD AUTO: 58.7 %
PLATELET # BLD AUTO: 283 THOUSAND/UL (ref 140–400)
PMV BLD REES-ECKER: 11 FL (ref 7.5–12.5)
POTASSIUM SERPL-SCNC: 4.5 MMOL/L (ref 3.5–5.3)
PROT SERPL-MCNC: 7.5 G/DL (ref 6.1–8.1)
RBC # BLD AUTO: 4.81 MILLION/UL (ref 3.8–5.1)
SODIUM SERPL-SCNC: 140 MMOL/L (ref 135–146)
WBC # BLD AUTO: 5 THOUSAND/UL (ref 3.8–10.8)

## 2025-03-13 PROCEDURE — 83993 ASSAY FOR CALPROTECTIN FECAL: CPT

## 2025-03-14 ENCOUNTER — LAB (OUTPATIENT)
Dept: LAB | Facility: HOSPITAL | Age: 30
End: 2025-03-14
Payer: COMMERCIAL

## 2025-03-14 DIAGNOSIS — K50.019 CROHN'S DISEASE OF SMALL INTESTINE WITH COMPLICATION (MULTI): ICD-10-CM

## 2025-03-17 LAB — CALPROTECTIN STL-MCNT: 11 UG/G

## 2025-03-20 LAB — CALPROTECTIN STL-MCNT: NORMAL UG/G

## 2025-03-26 ENCOUNTER — APPOINTMENT (OUTPATIENT)
Dept: GASTROENTEROLOGY | Facility: HOSPITAL | Age: 30
End: 2025-03-26
Payer: COMMERCIAL

## 2025-03-30 DIAGNOSIS — Z12.11 COLON CANCER SCREENING: Primary | ICD-10-CM

## 2025-04-15 ENCOUNTER — OFFICE VISIT (OUTPATIENT)
Dept: OBSTETRICS AND GYNECOLOGY | Facility: HOSPITAL | Age: 30
End: 2025-04-15
Payer: COMMERCIAL

## 2025-04-15 VITALS
DIASTOLIC BLOOD PRESSURE: 73 MMHG | HEART RATE: 83 BPM | WEIGHT: 123.2 LBS | HEIGHT: 62 IN | BODY MASS INDEX: 22.67 KG/M2 | SYSTOLIC BLOOD PRESSURE: 108 MMHG

## 2025-04-15 DIAGNOSIS — M62.89 HIGH-TONE PELVIC FLOOR DYSFUNCTION: ICD-10-CM

## 2025-04-15 DIAGNOSIS — Z01.419 WELL WOMAN EXAM: Primary | ICD-10-CM

## 2025-04-15 DIAGNOSIS — Z12.4 SCREENING FOR MALIGNANT NEOPLASM OF CERVIX: ICD-10-CM

## 2025-04-15 PROCEDURE — 99385 PREV VISIT NEW AGE 18-39: CPT | Performed by: STUDENT IN AN ORGANIZED HEALTH CARE EDUCATION/TRAINING PROGRAM

## 2025-04-15 PROCEDURE — 3008F BODY MASS INDEX DOCD: CPT | Performed by: STUDENT IN AN ORGANIZED HEALTH CARE EDUCATION/TRAINING PROGRAM

## 2025-04-15 PROCEDURE — 88175 CYTOPATH C/V AUTO FLUID REDO: CPT | Mod: TC,GCY | Performed by: STUDENT IN AN ORGANIZED HEALTH CARE EDUCATION/TRAINING PROGRAM

## 2025-04-15 PROCEDURE — 1036F TOBACCO NON-USER: CPT | Performed by: STUDENT IN AN ORGANIZED HEALTH CARE EDUCATION/TRAINING PROGRAM

## 2025-04-15 SDOH — ECONOMIC STABILITY: FOOD INSECURITY: WITHIN THE PAST 12 MONTHS, THE FOOD YOU BOUGHT JUST DIDN'T LAST AND YOU DIDN'T HAVE MONEY TO GET MORE.: NEVER TRUE

## 2025-04-15 SDOH — ECONOMIC STABILITY: FOOD INSECURITY: WITHIN THE PAST 12 MONTHS, YOU WORRIED THAT YOUR FOOD WOULD RUN OUT BEFORE YOU GOT MONEY TO BUY MORE.: NEVER TRUE

## 2025-04-15 ASSESSMENT — ENCOUNTER SYMPTOMS
LOSS OF SENSATION IN FEET: 0
OCCASIONAL FEELINGS OF UNSTEADINESS: 0
DEPRESSION: 0

## 2025-04-15 ASSESSMENT — PATIENT HEALTH QUESTIONNAIRE - PHQ9
SUM OF ALL RESPONSES TO PHQ9 QUESTIONS 1 & 2: 0
2. FEELING DOWN, DEPRESSED OR HOPELESS: NOT AT ALL
1. LITTLE INTEREST OR PLEASURE IN DOING THINGS: NOT AT ALL

## 2025-04-15 ASSESSMENT — PAIN SCALES - GENERAL: PAINLEVEL_OUTOF10: 0-NO PAIN

## 2025-04-15 NOTE — PROGRESS NOTES
Subjective   Leni Valles is a 29 y.o. y.o. here for a routine exam. Current concerns: pelvic pain particularly a few days prior to menses and in first few days and also with intercourse, primarily with deep penetration. These symptoms are both new, starting about 6 months. Episodes last around 7 days. Tylenol helps but does not resolve.     Gynecologic History  Patient's last menstrual period was 2025..  Contraception: condoms  Last Pap: . Results were: negative cytology  HPV vaccination: yes  Last colon cancer screen: n/a but has had colonoscopies for her Crohn's disease last 2 years ago. Goes every 2 years.    Obstetric History  OB History    Para Term  AB Living   0 0 0 0 0 0   SAB IAB Ectopic Multiple Live Births   0 0 0 0 0       Objective   Physical Exam  Vitals and nursing note reviewed.   Constitutional:       Appearance: Normal appearance.   HENT:      Head: Normocephalic and atraumatic.      Nose: Nose normal.      Mouth/Throat:      Mouth: Mucous membranes are moist.   Eyes:      Extraocular Movements: Extraocular movements intact.      Conjunctiva/sclera: Conjunctivae normal.   Pulmonary:      Effort: Pulmonary effort is normal.   Chest:      Chest wall: No deformity or swelling.   Breasts:     Breasts are symmetrical.      Right: Normal.      Left: Normal.   Abdominal:      General: There is no distension.      Palpations: Abdomen is soft. There is no mass.      Tenderness: There is no abdominal tenderness.   Genitourinary:     General: Normal vulva.      Vagina: Normal.      Cervix: Normal.      Uterus: Normal.       Adnexa: Right adnexa normal and left adnexa normal.      Rectum: Normal.      Comments: Increased tone and tenderness to palpation throughout pelvic floor, otherwise normal exam  Musculoskeletal:      Cervical back: Normal range of motion.   Skin:     General: Skin is warm and dry.   Neurological:      General: No focal deficit present.      Mental Status: She  is alert.   Psychiatric:         Mood and Affect: Mood normal.         Behavior: Behavior normal.         Thought Content: Thought content normal.         Judgment: Judgment normal.         Assessment/Plan   Unremarkable gynecologic exam.    Cytology collected.    Reviewed potential etiologies of pain she's been experiencing and discussed implications of exam findings. Advised pelvic floor PT and she is amenable to referral. Offered FUV in 3-6 months versus as needed and she prefers as needed.    Follow up for well care or as needed.    Idalia Taylor MD

## 2025-04-27 LAB
CYTOLOGY CMNT CVX/VAG CYTO-IMP: NORMAL
LAB AP HPV GENOTYPE QUESTION: YES
LAB AP HPV HR: NORMAL
LABORATORY COMMENT REPORT: NORMAL
LMP START DATE: NORMAL
PATH REPORT.TOTAL CANCER: NORMAL

## 2025-05-08 ENCOUNTER — APPOINTMENT (OUTPATIENT)
Dept: INFUSION THERAPY | Facility: CLINIC | Age: 30
End: 2025-05-08
Payer: COMMERCIAL

## 2025-05-08 VITALS
BODY MASS INDEX: 23.1 KG/M2 | TEMPERATURE: 98.8 F | WEIGHT: 126.32 LBS | RESPIRATION RATE: 18 BRPM | DIASTOLIC BLOOD PRESSURE: 83 MMHG | HEART RATE: 64 BPM | SYSTOLIC BLOOD PRESSURE: 117 MMHG | OXYGEN SATURATION: 99 %

## 2025-05-08 DIAGNOSIS — K50.019 CROHN'S DISEASE OF SMALL INTESTINE WITH COMPLICATION (MULTI): ICD-10-CM

## 2025-05-08 PROCEDURE — 96365 THER/PROPH/DIAG IV INF INIT: CPT | Performed by: NURSE PRACTITIONER

## 2025-05-08 RX ORDER — DIPHENHYDRAMINE HYDROCHLORIDE 50 MG/ML
50 INJECTION, SOLUTION INTRAMUSCULAR; INTRAVENOUS AS NEEDED
OUTPATIENT
Start: 2025-07-02

## 2025-05-08 RX ORDER — ALBUTEROL SULFATE 0.83 MG/ML
3 SOLUTION RESPIRATORY (INHALATION) AS NEEDED
OUTPATIENT
Start: 2025-07-02

## 2025-05-08 RX ORDER — FAMOTIDINE 10 MG/ML
20 INJECTION, SOLUTION INTRAVENOUS ONCE AS NEEDED
OUTPATIENT
Start: 2025-07-02

## 2025-05-08 RX ORDER — EPINEPHRINE 0.3 MG/.3ML
0.3 INJECTION SUBCUTANEOUS EVERY 5 MIN PRN
OUTPATIENT
Start: 2025-07-02

## 2025-05-08 ASSESSMENT — ENCOUNTER SYMPTOMS
TROUBLE SWALLOWING: 0
FEVER: 0
DYSURIA: 0
LIGHT-HEADEDNESS: 0
NERVOUS/ANXIOUS: 1
WOUND: 0
DEPRESSION: 0
VOICE CHANGE: 0
UNEXPECTED WEIGHT CHANGE: 0
NUMBNESS: 0
SORE THROAT: 0
EXTREMITY WEAKNESS: 0
LEG SWELLING: 0
ABDOMINAL PAIN: 1
PALPITATIONS: 0
BRUISES/BLEEDS EASILY: 0
SHORTNESS OF BREATH: 0
CHILLS: 0
VOMITING: 0
BLOOD IN STOOL: 0
WHEEZING: 0
COUGH: 0
HEMATURIA: 0
DIARRHEA: 1
DIZZINESS: 0
MYALGIAS: 0
APPETITE CHANGE: 0
EYE PROBLEMS: 0
ARTHRALGIAS: 1
NAUSEA: 0
FATIGUE: 0
HEADACHES: 0
FREQUENCY: 0
CONSTIPATION: 0

## 2025-05-08 NOTE — PROGRESS NOTES
[facility]   Infusion Clinic Note   Date: May 8, 2025   Name: Leni Valles  : 1995   MRN: [id number]         Reason for Visit: OP Infusion (PT HERE FOR Q56 DAY ENTYVIO 300 MG INFUSION)         Today: We administered vedolizumab (Entyvio) 300 mg in sodium chloride 0.9% 255 mL IV.       Ordered By: Gordon Toussaint MD       For a Diagnosis of: Crohn's disease of small intestine with complication (Multi)       At today's visit patient accompanied by: Parent      Today's Vitals:   Vitals:    25 1125 25 1220   BP: 103/71 117/83   Pulse: 64 64   Resp: 16 18   Temp: 36.9 °C (98.4 °F) 37.1 °C (98.8 °F)   SpO2: 98% 99%   Weight: 57.3 kg (126 lb 5.2 oz)    LMP: 2025             Pre - Treatment Checklist:      - Previous reaction to current treatment: no      (Assess patient for the concerns below. Document provider notification as appropriate).  - Active or recent infection with/without current antibiotic use: no  - Recent or planned invasive dental work: no  - Recent or planned surgeries: no  - Recently received or plans to receive vaccinations: no  - Has treatment related toxicities: no  - Any chance may be pregnant:  no      Pain: 0   - Is the pain different from normal: n/a   - Is prescribing Doctor aware:  n/a      Labs: N/A      Fall Risk Screening: Jain Fall Risk  History of Falling, Immediate or Within 3 Months: No  Secondary Diagnosis: No  Ambulatory Aid: Walks without aid/bedrest/nurse assist  Intravenous Therapy/Heparin Lock: Yes  Gait/Transferring: Normal/bedrest/immobile  Mental Status: Oriented to own ability  Jain Fall Risk Score: 20       Review Of Systems:  Review of Systems   Constitutional:  Negative for appetite change, chills, fatigue, fever and unexpected weight change.   HENT:   Negative for hearing loss, mouth sores, sore throat, tinnitus, trouble swallowing and voice change.    Eyes:  Negative for eye problems.   Respiratory:  Negative for cough,  "shortness of breath and wheezing.    Cardiovascular:  Negative for chest pain, leg swelling and palpitations.   Gastrointestinal:  Positive for abdominal pain and diarrhea. Negative for blood in stool, constipation, nausea and vomiting.        ADMITS TO APPROX. 2-3 STOOLS PER DAY, VARIES IN CONSISTENCY  DENIES CONSTIPATION BUT ADMITS TO DIARRHEA  DENIES BLOOD IN THE STOOL BUT ADMITS TO MUCOUS  ADMITS TO ABDOMINAL PAIN BUT DENIES N/V   Genitourinary:  Negative for dysuria, frequency and hematuria.    Musculoskeletal:  Positive for arthralgias. Negative for myalgias.        PT ADMITS TO PAIN IN LOWER BACK AND SHOULDERS AT BASELINE   Skin:  Negative for itching, rash and wound.   Neurological:  Negative for dizziness, extremity weakness, headaches, light-headedness and numbness.   Hematological:  Does not bruise/bleed easily.   Psychiatric/Behavioral:  Negative for depression. The patient is nervous/anxious.         MANAGING WITH MEDICATIONS         Infusion Readiness:  - Assessment Concerns Related to Infusion: No  - Provider notified: n/a      New Patient Education:    N/A (returning patient for continuation of therapy. Ongoing education provided as needed.)        Treatment Conditions & Drug Specific Questions:    Vedolizumab  (ENTYVIO)    (Unless otherwise specified on patient specific therapy plan):     TREATMENT CONDITIONS:  Unless otherwise specified on patient specific therapy plan HOLD and notify provider prior to proceeding with treatment if:   o Positive T-Spot  o Patient has signs or symptoms of Progressive Multifocal Leukoencephalopath (PML)     No results found for: \"HAGCN\", \"HEPBSURABI\", \"HBSAG\", \"XHAGF\", \"HEPBSAG\", \"EXTHEPBSAG\", \"NONUHSWGH\"   No results found for: \"NONUHFIRE\", \"NONUHSWGH\", \"NONUHFISH\", \"EXTHEPBSAG\"  Lab Results   Component Value Date    TBSIN Negative 05/02/2022    TBGRES Negative  Reference range: NEGATIVE   06/24/2019      Lab Results   Component Value Date    ALT 15 03/12/2025    AST " 16 03/12/2025    ALKPHOS 45 03/12/2025    BILITOT 0.7 03/12/2025        Patient meets treatment conditions? YES    DRUG SPECIFIC QUESTIONS:  Any signs or symptoms of Progressive Multifocal Leukoencephalopath (PML) which may include: memory loss, trouble thinking, loss of balance, difficulty talking or walking, loss of vision?  No    (If YES notify prescribing provider prior to proceeding)    REMINDERS:  Recommended Vitals/Observation:  Vitals: Monitor vitals at start of infusion and at completion of infusion.  Observation: First 3 infusions patient may leave 15 minutes post infusion. Subsequent maintenance infusions: if no reaction, may leave immediately post infusion.        Weight Based Drug Calculations:    WEIGHT BASED DRUGS: NOT APPLICABLE / FLAT DOSE       Post Treatment: Patient tolerated treatment without issue and was discharged in no apparent distress.      Note Authored / Patient Cared for By: Rachel Anglin RN

## 2025-05-08 NOTE — PATIENT INSTRUCTIONS
Today :We administered vedolizumab (Entyvio) 300 mg in sodium chloride 0.9% 255 mL IV.     For:   1. Crohn's disease of small intestine with complication (Multi)         Your next appointment is due in:  8 WEEKS        Please read the  Medication Guide that was given to you and reviewed during todays visit.     (Tell all doctors including dentists that you are taking this medication)     Go to the emergency room or call 911 if:  -You have signs of allergic reaction:   -Rash, hives, itching.   -Swollen, blistered, peeling skin.   -Swelling of face, lips, mouth, tongue or throat.   -Tightness of chest, trouble breathing, swallowing or talking     Call your doctor:  - If IV / injection site gets red, warm, swollen, itchy or leaks fluid or pus.     (Leave dressing on your IV site for at least 2 hours and keep area clean and dry  - If you get sick or have symptoms of infection or are not feeling well for any reason.    (Wash your hands often, stay away from people who are sick)  - If you have side effects from your medication that do not go away or are bothersome.     (Refer to the teaching your nurse gave you for side effects to call your doctor about)    - Common side effects may include:  stuffy nose, headache, feeling tired, muscle aches, upset stomach  - Before receiving any vaccines     - Call [facility] at   If:  - You get sick, are on antibiotics, have had a recent vaccine, have surgery or dental work and your doctor wants your visit rescheduled.  - You need to cancel and reschedule your visit for any reason. Call at least 2 days before your visit if you need to cancel.   - Your insurance changes before your next visit.    (We will need to get approval from your new insurance. This can take up to two weeks.)     The [facility] is opened Monday thru Friday. We are closed on weekends and holidays.   Voice mail will take your call if the center is closed. If you leave a message  please allow 24 hours for a call back during weekdays. If you leave a message on a weekend/holiday, we will call you back the next business day.    A pharmacist is available Monday - Friday from 8:30AM to 3:30PM to help answer any questions you may have about your prescriptions(s). Please call pharmacy at:    [facility]: [phone]  [facility]: [phone]  [facility]: [phone]

## 2025-05-14 ENCOUNTER — APPOINTMENT (OUTPATIENT)
Dept: PRIMARY CARE | Facility: CLINIC | Age: 30
End: 2025-05-14
Payer: COMMERCIAL

## 2025-05-14 VITALS
TEMPERATURE: 96.9 F | OXYGEN SATURATION: 98 % | DIASTOLIC BLOOD PRESSURE: 71 MMHG | HEIGHT: 62 IN | WEIGHT: 124 LBS | SYSTOLIC BLOOD PRESSURE: 104 MMHG | HEART RATE: 69 BPM | BODY MASS INDEX: 22.82 KG/M2

## 2025-05-14 DIAGNOSIS — F41.9 ANXIETY: ICD-10-CM

## 2025-05-14 DIAGNOSIS — Z00.00 HEALTHCARE MAINTENANCE: Primary | ICD-10-CM

## 2025-05-14 DIAGNOSIS — K50.919 CROHN'S DISEASE WITH COMPLICATION, UNSPECIFIED GASTROINTESTINAL TRACT LOCATION: ICD-10-CM

## 2025-05-14 PROCEDURE — 1036F TOBACCO NON-USER: CPT | Performed by: STUDENT IN AN ORGANIZED HEALTH CARE EDUCATION/TRAINING PROGRAM

## 2025-05-14 PROCEDURE — 99395 PREV VISIT EST AGE 18-39: CPT | Performed by: STUDENT IN AN ORGANIZED HEALTH CARE EDUCATION/TRAINING PROGRAM

## 2025-05-14 PROCEDURE — 3008F BODY MASS INDEX DOCD: CPT | Performed by: STUDENT IN AN ORGANIZED HEALTH CARE EDUCATION/TRAINING PROGRAM

## 2025-05-14 ASSESSMENT — PAIN SCALES - GENERAL: PAINLEVEL_OUTOF10: 0-NO PAIN

## 2025-05-14 NOTE — PATIENT INSTRUCTIONS
Your blood work is up to date; no need for additional draw at this appointment    Your medications are up to date today, I will renew them when a refill is due.     Good luck with your upcoming colonoscopy.    See me yearly for a complete physical exam, and sooner as needed for sick visits
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
## 2025-06-10 DIAGNOSIS — K50.019 CROHN'S DISEASE OF SMALL INTESTINE WITH COMPLICATION (MULTI): ICD-10-CM

## 2025-06-13 DIAGNOSIS — K50.019 CROHN'S DISEASE OF SMALL INTESTINE WITH COMPLICATION (MULTI): Primary | ICD-10-CM

## 2025-06-13 RX ORDER — POLYETHYLENE GLYCOL 3350, SODIUM SULFATE ANHYDROUS, SODIUM BICARBONATE, SODIUM CHLORIDE, POTASSIUM CHLORIDE 236; 22.74; 6.74; 5.86; 2.97 G/4L; G/4L; G/4L; G/4L; G/4L
4 POWDER, FOR SOLUTION ORAL ONCE
Qty: 4000 ML | Refills: 0 | Status: SHIPPED | OUTPATIENT
Start: 2025-06-13 | End: 2025-06-13

## 2025-06-13 RX ORDER — SIMETHICONE 125 MG
125 TABLET,CHEWABLE ORAL
Qty: 2 TABLET | Refills: 0 | Status: SHIPPED | OUTPATIENT
Start: 2025-06-13

## 2025-06-16 ENCOUNTER — ANESTHESIA EVENT (OUTPATIENT)
Dept: GASTROENTEROLOGY | Facility: HOSPITAL | Age: 30
End: 2025-06-16
Payer: COMMERCIAL

## 2025-06-16 ENCOUNTER — ANESTHESIA (OUTPATIENT)
Dept: GASTROENTEROLOGY | Facility: HOSPITAL | Age: 30
End: 2025-06-16
Payer: COMMERCIAL

## 2025-06-16 ENCOUNTER — APPOINTMENT (OUTPATIENT)
Dept: GASTROENTEROLOGY | Facility: HOSPITAL | Age: 30
End: 2025-06-16
Payer: COMMERCIAL

## 2025-06-16 VITALS
RESPIRATION RATE: 16 BRPM | OXYGEN SATURATION: 99 % | SYSTOLIC BLOOD PRESSURE: 108 MMHG | TEMPERATURE: 97.2 F | HEIGHT: 62 IN | HEART RATE: 63 BPM | WEIGHT: 125 LBS | DIASTOLIC BLOOD PRESSURE: 75 MMHG | BODY MASS INDEX: 23 KG/M2

## 2025-06-16 DIAGNOSIS — K50.019 CROHN'S DISEASE OF SMALL INTESTINE WITH COMPLICATION (MULTI): ICD-10-CM

## 2025-06-16 LAB — PREGNANCY TEST URINE, POC: NEGATIVE

## 2025-06-16 PROCEDURE — 7100000009 HC PHASE TWO TIME - INITIAL BASE CHARGE

## 2025-06-16 PROCEDURE — 45378 DIAGNOSTIC COLONOSCOPY: CPT | Performed by: STUDENT IN AN ORGANIZED HEALTH CARE EDUCATION/TRAINING PROGRAM

## 2025-06-16 PROCEDURE — 3700000001 HC GENERAL ANESTHESIA TIME - INITIAL BASE CHARGE

## 2025-06-16 PROCEDURE — 3700000002 HC GENERAL ANESTHESIA TIME - EACH INCREMENTAL 1 MINUTE

## 2025-06-16 PROCEDURE — 81025 URINE PREGNANCY TEST: CPT | Performed by: STUDENT IN AN ORGANIZED HEALTH CARE EDUCATION/TRAINING PROGRAM

## 2025-06-16 PROCEDURE — 7100000010 HC PHASE TWO TIME - EACH INCREMENTAL 1 MINUTE

## 2025-06-16 PROCEDURE — 2500000004 HC RX 250 GENERAL PHARMACY W/ HCPCS (ALT 636 FOR OP/ED): Performed by: ANESTHESIOLOGIST ASSISTANT

## 2025-06-16 RX ORDER — HYDROMORPHONE HYDROCHLORIDE 1 MG/ML
0.5 INJECTION, SOLUTION INTRAMUSCULAR; INTRAVENOUS; SUBCUTANEOUS EVERY 5 MIN PRN
Status: DISCONTINUED | OUTPATIENT
Start: 2025-06-16 | End: 2025-06-17 | Stop reason: HOSPADM

## 2025-06-16 RX ORDER — OXYCODONE HYDROCHLORIDE 5 MG/1
5 TABLET ORAL EVERY 4 HOURS PRN
Status: DISCONTINUED | OUTPATIENT
Start: 2025-06-16 | End: 2025-06-17 | Stop reason: HOSPADM

## 2025-06-16 RX ORDER — ALBUTEROL SULFATE 0.83 MG/ML
2.5 SOLUTION RESPIRATORY (INHALATION) ONCE AS NEEDED
Status: DISCONTINUED | OUTPATIENT
Start: 2025-06-16 | End: 2025-06-17 | Stop reason: HOSPADM

## 2025-06-16 RX ORDER — MIDAZOLAM HYDROCHLORIDE 1 MG/ML
INJECTION INTRAMUSCULAR; INTRAVENOUS AS NEEDED
Status: DISCONTINUED | OUTPATIENT
Start: 2025-06-16 | End: 2025-06-16

## 2025-06-16 RX ORDER — FENTANYL CITRATE 50 UG/ML
INJECTION, SOLUTION INTRAMUSCULAR; INTRAVENOUS AS NEEDED
Status: DISCONTINUED | OUTPATIENT
Start: 2025-06-16 | End: 2025-06-16

## 2025-06-16 RX ORDER — METOCLOPRAMIDE HYDROCHLORIDE 5 MG/ML
10 INJECTION INTRAMUSCULAR; INTRAVENOUS ONCE AS NEEDED
Status: DISCONTINUED | OUTPATIENT
Start: 2025-06-16 | End: 2025-06-17 | Stop reason: HOSPADM

## 2025-06-16 RX ORDER — LIDOCAINE IN NACL,ISO-OSMOT/PF 30 MG/3 ML
0.1 SYRINGE (ML) INJECTION ONCE
Status: DISCONTINUED | OUTPATIENT
Start: 2025-06-16 | End: 2025-06-17 | Stop reason: HOSPADM

## 2025-06-16 RX ORDER — LIDOCAINE HYDROCHLORIDE 20 MG/ML
INJECTION, SOLUTION INFILTRATION; PERINEURAL AS NEEDED
Status: DISCONTINUED | OUTPATIENT
Start: 2025-06-16 | End: 2025-06-16

## 2025-06-16 RX ORDER — ONDANSETRON HYDROCHLORIDE 2 MG/ML
4 INJECTION, SOLUTION INTRAVENOUS ONCE AS NEEDED
Status: DISCONTINUED | OUTPATIENT
Start: 2025-06-16 | End: 2025-06-17 | Stop reason: HOSPADM

## 2025-06-16 RX ORDER — SODIUM CHLORIDE, SODIUM LACTATE, POTASSIUM CHLORIDE, CALCIUM CHLORIDE 600; 310; 30; 20 MG/100ML; MG/100ML; MG/100ML; MG/100ML
100 INJECTION, SOLUTION INTRAVENOUS CONTINUOUS
Status: DISCONTINUED | OUTPATIENT
Start: 2025-06-16 | End: 2025-06-17 | Stop reason: HOSPADM

## 2025-06-16 RX ORDER — FENTANYL CITRATE 50 UG/ML
25 INJECTION, SOLUTION INTRAMUSCULAR; INTRAVENOUS EVERY 5 MIN PRN
Status: DISCONTINUED | OUTPATIENT
Start: 2025-06-16 | End: 2025-06-17 | Stop reason: HOSPADM

## 2025-06-16 RX ORDER — PROPOFOL 10 MG/ML
INJECTION, EMULSION INTRAVENOUS CONTINUOUS PRN
Status: DISCONTINUED | OUTPATIENT
Start: 2025-06-16 | End: 2025-06-16

## 2025-06-16 RX ADMIN — FENTANYL CITRATE 25 MCG: 50 INJECTION, SOLUTION INTRAMUSCULAR; INTRAVENOUS at 09:25

## 2025-06-16 RX ADMIN — FENTANYL CITRATE 25 MCG: 50 INJECTION, SOLUTION INTRAMUSCULAR; INTRAVENOUS at 09:28

## 2025-06-16 RX ADMIN — PROPOFOL 50 MG: 10 INJECTION, EMULSION INTRAVENOUS at 09:33

## 2025-06-16 RX ADMIN — LIDOCAINE HYDROCHLORIDE 50 MG: 20 INJECTION, SOLUTION INFILTRATION; PERINEURAL at 09:24

## 2025-06-16 RX ADMIN — PROPOFOL 200 MCG/KG/MIN: 10 INJECTION, EMULSION INTRAVENOUS at 09:24

## 2025-06-16 RX ADMIN — PROPOFOL 30 MG: 10 INJECTION, EMULSION INTRAVENOUS at 09:29

## 2025-06-16 RX ADMIN — FENTANYL CITRATE 50 MCG: 50 INJECTION, SOLUTION INTRAMUSCULAR; INTRAVENOUS at 09:42

## 2025-06-16 RX ADMIN — PROPOFOL 30 MG: 10 INJECTION, EMULSION INTRAVENOUS at 09:25

## 2025-06-16 RX ADMIN — PROPOFOL 50 MG: 10 INJECTION, EMULSION INTRAVENOUS at 09:41

## 2025-06-16 RX ADMIN — SODIUM CHLORIDE, SODIUM LACTATE, POTASSIUM CHLORIDE, AND CALCIUM CHLORIDE: 600; 310; 30; 20 INJECTION, SOLUTION INTRAVENOUS at 09:21

## 2025-06-16 RX ADMIN — MIDAZOLAM HYDROCHLORIDE 2 MG: 2 INJECTION, SOLUTION INTRAMUSCULAR; INTRAVENOUS at 09:21

## 2025-06-16 SDOH — HEALTH STABILITY: MENTAL HEALTH: CURRENT SMOKER: 0

## 2025-06-16 ASSESSMENT — PAIN SCALES - GENERAL
PAINLEVEL_OUTOF10: 0 - NO PAIN
PAINLEVEL_OUTOF10: 0 - NO PAIN
PAIN_LEVEL: 0
PAINLEVEL_OUTOF10: 0 - NO PAIN
PAINLEVEL_OUTOF10: 0 - NO PAIN

## 2025-06-16 ASSESSMENT — COLUMBIA-SUICIDE SEVERITY RATING SCALE - C-SSRS
6. HAVE YOU EVER DONE ANYTHING, STARTED TO DO ANYTHING, OR PREPARED TO DO ANYTHING TO END YOUR LIFE?: NO
2. HAVE YOU ACTUALLY HAD ANY THOUGHTS OF KILLING YOURSELF?: NO
1. IN THE PAST MONTH, HAVE YOU WISHED YOU WERE DEAD OR WISHED YOU COULD GO TO SLEEP AND NOT WAKE UP?: NO

## 2025-06-16 ASSESSMENT — PAIN - FUNCTIONAL ASSESSMENT
PAIN_FUNCTIONAL_ASSESSMENT: 0-10

## 2025-06-16 NOTE — ANESTHESIA PREPROCEDURE EVALUATION
Patient: Leni Valles    Procedure Information       Date/Time: 06/16/25 0850    Scheduled providers: Refugio Johnson MD; Stefany Cade RN    Procedure: COLONOSCOPY    Location: [facility]          ALLERGIES:  Allergies   Allergen Reactions    Lactose Unknown        MEDICAL HISTORY:  Past Medical History:   Diagnosis Date    Crohn's colitis (Multi)     IBD (inflammatory bowel disease)     Other conditions influencing health status     No significant past medical history        Relevant Problems   GI   (+) Crohn's disease (Multi)      Nervous   (+) Chronic fatigue disorder   (+) Periumbilical abdominal pain      Digestive   (+) Diarrhea   (+) Fistula of intestine, excluding rectum and anus      Immune   (+) Immunosuppressed status      Gastrointestinal and Abdominal   (+) History of colostomy reversal        SURGICAL HISTORY:  Past Surgical History:   Procedure Laterality Date    OTHER SURGICAL HISTORY  02/03/2022    Small bowel resection - crohn's flare, then with ileostomy and reversal    OTHER SURGICAL HISTORY  05/02/2022    Esophagogastroduodenoscopy    OTHER SURGICAL HISTORY  05/02/2022    Ileostomy closure    OTHER SURGICAL HISTORY  05/02/2022    Colonoscopy        MEDICATIONS:  Current Outpatient Medications   Medication Instructions    dicyclomine (BENTYL) 10 mg, oral, Every 8 hours PRN    escitalopram (LEXAPRO) 15 mg, oral, Daily    LACTASE-RENNET ORAL 1 tablet, 3 times daily (morning, midday, late afternoon)    multivitamin capsule 1 capsule, Daily    naproxen (NAPROSYN) 500 mg, Every 12 hours PRN    simethicone (MYLICON) 125 mg, oral, Twice a day (mid-day and evening), Please take one (1) tablet with your first half of your bowel prep on the night before your colonoscopy. Then take one (1) tablet with the second half of your bowel prep on the day of your colonoscopy.    sod sulf-pot chloride-mag sulf (Sutab) 1.479-0.188- 0.225 gram tablet tablet 24 tablets, oral, See admin  "instructions, TAKE PER BOWEL PREP INTSTRUCTIONS    vedolizumab (ENTYVIO) 300 mg, intravenous, Once, For Maintenance: every 8 weeks    vedolizumab (ENTYVIO) 300 mg, intravenous, Every 8 weeks        VITALS:      5/14/2025     3:08 PM 5/8/2025    12:20 PM 5/8/2025    11:25 AM   Vitals   Systolic 104 117 103   Diastolic 71 83 71   BP Location   Left arm   Heart Rate 69 64 64   Temp 36.1 °C (96.9 °F) 37.1 °C (98.8 °F) 36.9 °C (98.4 °F)   Resp  18 16   Height 1.575 m (5' 2\")     Weight (lb) 124  126.32   BMI 22.68 kg/m2  23.1 kg/m2   BSA (m2) 1.57 m2  1.58 m2   Visit Report Report         LABS:   BMP   Lab Results   Component Value Date    GLUCOSE 78 03/12/2025    CALCIUM 9.5 03/12/2025     03/12/2025    K 4.5 03/12/2025    CO2 29 03/12/2025     03/12/2025    BUN 11 03/12/2025    CREATININE 0.61 03/12/2025   , LFT   Lab Results   Component Value Date    ALT 15 03/12/2025    AST 16 03/12/2025    ALKPHOS 45 03/12/2025    BILITOT 0.7 03/12/2025   , CBC  Lab Results   Component Value Date    WBC 5.0 03/12/2025    HGB 13.5 03/12/2025    HCT 41.7 03/12/2025    MCV 86.7 03/12/2025     03/12/2025          , Coags No results found for: \"PROTIME\", \"INR\", \"APTT\"   , A1C   Lab Results   Component Value Date    HGBA1C 4.7 07/20/2023       IMAGES:  EKG        No results found for this or any previous visit (from the past 4464 hours).   , ECHO       No results found for this or any previous visit from the past 730 days.    , CARDIAC CATH      No results found for this or any previous visit from the past 730 days.   , CXR     No results found for this or any previous visit from the past 730 days.    , CT Head/Neck     No results found for this or any previous visit from the past 760 days.    , CT Chest      No results found for this or any previous visit from the past 730 days.   , CT Abdomin     No results found for this or any previous visit from the past 730 days.    SOCIAL:  Social History     Tobacco Use "   Smoking Status Never   Smokeless Tobacco Never      Social History     Substance and Sexual Activity   Alcohol Use Yes    Alcohol/week: 1.0 standard drink of alcohol    Types: 1 Standard drinks or equivalent per week      Social History     Substance and Sexual Activity   Drug Use Never        NPO STATUS:  No data recorded    Clinical Areas Reviewed:                   Anesthesia Assessment:    Physical Exam    Airway  Mallampati: I  Neck ROM: full     Cardiovascular - normal exam  Rhythm: regular  Rate: normal     Dental - normal exam     Pulmonary - normal examBreath sounds clear to auscultation     Abdominal - normal exam           Anesthesia Plan    History of general anesthesia?: yes  History of complications of general anesthesia?: no    ASA 1     MAC   (    Risks discussed to Patient's satisfaction  )  The patient is not a current smoker.    intravenous induction   Postoperative pain plan includes opioids.  Anesthetic plan and risks discussed with patient.  Use of blood products discussed with patient who.    Plan discussed with CAA and attending.

## 2025-06-16 NOTE — ANESTHESIA POSTPROCEDURE EVALUATION
Patient: Leni Valles    Procedure Summary       Date: 06/16/25 Room / Location: [facility]    Anesthesia Start: 0921 Anesthesia Stop: 1003    Procedure: COLONOSCOPY Diagnosis: Crohn's disease of small intestine with complication (Multi)    Scheduled Providers: Refugio Johnson MD; Stefany Cade RN Responsible Provider: Bebo Larios MD    Anesthesia Type: MAC ASA Status: 1            Anesthesia Type: MAC    Vitals Value Taken Time   /64 06/16/25 10:00   Temp 36.2 °C (97.2 °F) 06/16/25 10:00   Pulse 69 06/16/25 10:00   Resp 17 06/16/25 10:00   SpO2 100 % 06/16/25 10:00       Anesthesia Post Evaluation    Patient location during evaluation: PACU  Patient participation: waiting for patient participation  Level of consciousness: sleepy but conscious  Pain score: 0  Pain management: adequate  Airway patency: patent  Cardiovascular status: acceptable  Respiratory status: acceptable  Hydration status: acceptable  Postoperative Nausea and Vomiting: none  Comments: Pt. Seen and examined.  No apparent anesthetic complications.          No notable events documented.

## 2025-06-16 NOTE — H&P
"History Of Present Illness  Leni Valles is a 30 y.o. female presenting with abdominal pain and a history of Crohn's disease. She has a history of ileocolectomy in 2012.      Past Medical History  Medical History[1]  Surgical History  Surgical History[2]  Social History  She reports that she has never smoked. She has never used smokeless tobacco. She reports that she does not currently use alcohol after a past usage of about 1.0 standard drink of alcohol per week. She reports that she does not use drugs.    Family History  Family History[3]     Allergies  Allergies[4]  Review of Systems     Physical Exam  Healthy adult in no acute distress, non toxic appearing  EOMI, no jaundice  Heart is RRR  Lungs are CTAB  Abdomen is soft and nttp  No LE edema  AOx3    Last Recorded Vitals  Blood pressure 128/80, pulse 98, temperature 36.9 °C (98.4 °F), temperature source Temporal, resp. rate 18, height 1.575 m (5' 2\"), weight 56.7 kg (125 lb), SpO2 98%.    Assessment/Plan   Proceed with diagnostic colonoscopy.      Refugio Johnson MD       [1]   Past Medical History:  Diagnosis Date    Anxiety     Crohn's colitis (Multi)     IBD (inflammatory bowel disease)     Other conditions influencing health status     No significant past medical history   [2]   Past Surgical History:  Procedure Laterality Date    OTHER SURGICAL HISTORY  02/03/2022    Small bowel resection - crohn's flare, then with ileostomy and reversal    OTHER SURGICAL HISTORY  05/02/2022    Esophagogastroduodenoscopy    OTHER SURGICAL HISTORY  05/02/2022    Ileostomy closure    OTHER SURGICAL HISTORY  05/02/2022    Colonoscopy   [3]   Family History  Problem Relation Name Age of Onset    Breast cancer Mother  40        genetic testing BRCA negative    Uterine cancer Mother  58    Ovarian cancer Mother      Hypertension Father          benign    Skin cancer Father      Lung cancer Maternal Grandmother      Leukemia Maternal Grandfather     [4] "   Allergies  Allergen Reactions    Lactose Unknown

## 2025-06-16 NOTE — DISCHARGE INSTRUCTIONS
How do I care for myself at home?  Ask the doctor or nurse what you should do when you go home. Make sure you understand exactly what you need to do to care for yourself. Ask questions if there is anything you do not understand.  For the rest of the day after the procedure:  ? Do not drive or operate heavy or dangerous machinery.  ? Do not make any important decisions or sign any important papers.  ? Do not drink alcohol of any kind.  ? Take extra care when moving about. You might be at a higher risk of falling.  When should I call the doctor?  Call for advice if you:  ? Have trouble breathing  ? Vomit for more than 24 hours, or cannot keep fluids down  ? Are dizzy  ? Have any symptoms that worry you

## 2025-07-03 ENCOUNTER — APPOINTMENT (OUTPATIENT)
Dept: INFUSION THERAPY | Facility: CLINIC | Age: 30
End: 2025-07-03
Payer: COMMERCIAL

## 2025-07-03 VITALS
RESPIRATION RATE: 18 BRPM | TEMPERATURE: 99.1 F | DIASTOLIC BLOOD PRESSURE: 76 MMHG | HEART RATE: 56 BPM | WEIGHT: 122.91 LBS | OXYGEN SATURATION: 99 % | BODY MASS INDEX: 22.48 KG/M2 | SYSTOLIC BLOOD PRESSURE: 110 MMHG

## 2025-07-03 DIAGNOSIS — K50.019 CROHN'S DISEASE OF SMALL INTESTINE WITH COMPLICATION (MULTI): ICD-10-CM

## 2025-07-03 RX ORDER — EPINEPHRINE 0.3 MG/.3ML
0.3 INJECTION SUBCUTANEOUS EVERY 5 MIN PRN
OUTPATIENT
Start: 2025-08-28

## 2025-07-03 RX ORDER — DIPHENHYDRAMINE HYDROCHLORIDE 50 MG/ML
50 INJECTION, SOLUTION INTRAMUSCULAR; INTRAVENOUS AS NEEDED
OUTPATIENT
Start: 2025-08-28

## 2025-07-03 RX ORDER — FAMOTIDINE 10 MG/ML
20 INJECTION, SOLUTION INTRAVENOUS ONCE AS NEEDED
OUTPATIENT
Start: 2025-08-28

## 2025-07-03 RX ORDER — ALBUTEROL SULFATE 0.83 MG/ML
3 SOLUTION RESPIRATORY (INHALATION) AS NEEDED
OUTPATIENT
Start: 2025-08-28

## 2025-07-03 ASSESSMENT — ENCOUNTER SYMPTOMS
COUGH: 0
NUMBNESS: 0
HEMATURIA: 0
SHORTNESS OF BREATH: 0
ABDOMINAL PAIN: 1
FATIGUE: 0
VOMITING: 0
WOUND: 0
NERVOUS/ANXIOUS: 1
UNEXPECTED WEIGHT CHANGE: 0
DIZZINESS: 0
BRUISES/BLEEDS EASILY: 0
PALPITATIONS: 0
FEVER: 0
SORE THROAT: 0
ARTHRALGIAS: 1
CONSTIPATION: 0
HEADACHES: 0
EYE PROBLEMS: 0
LEG SWELLING: 0
DYSURIA: 0
DEPRESSION: 0
TROUBLE SWALLOWING: 0
VOICE CHANGE: 0
APPETITE CHANGE: 0
EXTREMITY WEAKNESS: 0
FREQUENCY: 0
NAUSEA: 0
MYALGIAS: 0
LIGHT-HEADEDNESS: 0
DIARRHEA: 0
BLOOD IN STOOL: 0
WHEEZING: 0
CHILLS: 0

## 2025-07-03 NOTE — PATIENT INSTRUCTIONS
Today :Leni Valles had no medications administered during this visit.     For:   1. Crohn's disease of small intestine with complication (Multi)         Your next appointment is due in:  56 DAYS        Please read the  Medication Guide that was given to you and reviewed during todays visit.     (Tell all doctors including dentists that you are taking this medication)     Go to the emergency room or call 911 if:  -You have signs of allergic reaction:   -Rash, hives, itching.   -Swollen, blistered, peeling skin.   -Swelling of face, lips, mouth, tongue or throat.   -Tightness of chest, trouble breathing, swallowing or talking     Call your doctor:  - If IV / injection site gets red, warm, swollen, itchy or leaks fluid or pus.     (Leave dressing on your IV site for at least 2 hours and keep area clean and dry  - If you get sick or have symptoms of infection or are not feeling well for any reason.    (Wash your hands often, stay away from people who are sick)  - If you have side effects from your medication that do not go away or are bothersome.     (Refer to the teaching your nurse gave you for side effects to call your doctor about)    - Common side effects may include:  stuffy nose, headache, feeling tired, muscle aches, upset stomach  - Before receiving any vaccines     - Call [facility] at   If:  - You get sick, are on antibiotics, have had a recent vaccine, have surgery or dental work and your doctor wants your visit rescheduled.  - You need to cancel and reschedule your visit for any reason. Call at least 2 days before your visit if you need to cancel.   - Your insurance changes before your next visit.    (We will need to get approval from your new insurance. This can take up to two weeks.)     The [facility] is opened Monday thru Friday. We are closed on weekends and holidays.   Voice mail will take your call if the center is closed. If you leave a message please  allow 24 hours for a call back during weekdays. If you leave a message on a weekend/holiday, we will call you back the next business day.    A pharmacist is available Monday - Friday from 8:30AM to 3:30PM to help answer any questions you may have about your prescriptions(s). Please call pharmacy at:    [facility]: [phone]  [facility]: [phone]  [facility]: [phone]

## 2025-07-03 NOTE — PROGRESS NOTES
[facility]   Infusion Clinic Note   Date: July 3, 2025   Name: Leni Valles  : 1995   MRN: [id number]         Reason for Visit: OP Infusion and Follow-up (PT HERE FOR ENTYVIO 300 MG INFUSION/NEXT APPT: 56 DAYS)         Today: We administered vedolizumab (Entyvio) 300 mg in sodium chloride 0.9% 255 mL IV.       Ordered By: Gordon Toussaint MD       For a Diagnosis of: Crohn's disease of small intestine with complication (Multi)       At today's visit patient accompanied by: Self      Today's Vitals:   Vitals:    25 1122 25 1210   BP: 125/76 110/76   Pulse: 62 56   Resp: 18 18   Temp: 36.3 °C (97.3 °F) 37.3 °C (99.1 °F)   TempSrc: Temporal    SpO2: 98% 99%   Weight: 55.7 kg (122 lb 14.5 oz)              Pre - Treatment Checklist:      - Previous reaction to current treatment: no      (Assess patient for the concerns below. Document provider notification as appropriate).  - Active or recent infection with/without current antibiotic use: no  - Recent or planned invasive dental work: no  - Recent or planned surgeries: no  - Recently received or plans to receive vaccinations: no  - Has treatment related toxicities: no  - Any chance may be pregnant:  no      Pain: 0   - Is the pain different from normal: n/a   - Is prescribing Doctor aware:  n/a      Labs: N/A      Fall Risk Screening: Jain Fall Risk  History of Falling, Immediate or Within 3 Months: No  Secondary Diagnosis: No  Ambulatory Aid: Walks without aid/bedrest/nurse assist  Intravenous Therapy/Heparin Lock: Yes  Gait/Transferring: Normal/bedrest/immobile  Mental Status: Oriented to own ability  Jain Fall Risk Score: 20       Review Of Systems:  Review of Systems   Constitutional:  Negative for appetite change, chills, fatigue, fever and unexpected weight change.   HENT:   Negative for hearing loss, mouth sores, sore throat, tinnitus, trouble swallowing and voice change.    Eyes:  Negative for eye problems.  "  Respiratory:  Negative for cough, shortness of breath and wheezing.    Cardiovascular:  Negative for chest pain, leg swelling and palpitations.   Gastrointestinal:  Positive for abdominal pain. Negative for blood in stool, constipation, diarrhea, nausea and vomiting.        BLOATING PER PATIENT   Genitourinary:  Negative for dysuria, frequency and hematuria.    Musculoskeletal:  Positive for arthralgias. Negative for myalgias.        BASELINE   Skin:  Negative for itching, rash and wound.   Neurological:  Negative for dizziness, extremity weakness, headaches, light-headedness and numbness.   Hematological:  Does not bruise/bleed easily.   Psychiatric/Behavioral:  Negative for depression. The patient is nervous/anxious.         MANAGED         Infusion Readiness:  - Assessment Concerns Related to Infusion: No  - Provider notified: n/a      New Patient Education:    N/A (returning patient for continuation of therapy. Ongoing education provided as needed.)        Treatment Conditions & Drug Specific Questions:    Vedolizumab  (ENTYVIO)    (Unless otherwise specified on patient specific therapy plan):     TREATMENT CONDITIONS:  Unless otherwise specified on patient specific therapy plan HOLD and notify provider prior to proceeding with treatment if:   o Positive T-Spot  o Patient has signs or symptoms of Progressive Multifocal Leukoencephalopath (PML)     No results found for: \"HAGCN\", \"HEPBSURABI\", \"HBSAG\", \"XHAGF\", \"HEPBSAG\", \"NONUHSWGH\"   No results found for: \"NONUHFIRE\", \"NONUHSWGH\", \"NONUHFISH\"  Lab Results   Component Value Date    TBSIN Negative 05/02/2022    TBGRES Negative  Reference range: NEGATIVE   06/24/2019      Lab Results   Component Value Date    ALT 15 03/12/2025    AST 16 03/12/2025    ALKPHOS 45 03/12/2025    BILITOT 0.7 03/12/2025        Patient meets treatment conditions? Yes    DRUG SPECIFIC QUESTIONS:  Any signs or symptoms of Progressive Multifocal Leukoencephalopath (PML) which may include: " memory loss, trouble thinking, loss of balance, difficulty talking or walking, loss of vision?  No    (If YES notify prescribing provider prior to proceeding)    REMINDERS:  Recommended Vitals/Observation:  Vitals: Monitor vitals at start of infusion and at completion of infusion.  Observation: First 3 infusions patient may leave 15 minutes post infusion. Subsequent maintenance infusions: if no reaction, may leave immediately post infusion.        Weight Based Drug Calculations:    WEIGHT BASED DRUGS: NOT APPLICABLE / FLAT DOSE       Post Treatment: Patient tolerated treatment without issue and was discharged in no apparent distress.      Note Authored / Patient Cared for By: Ольга Holguin RN

## 2025-07-07 DIAGNOSIS — K50.019 CROHN'S DISEASE OF SMALL INTESTINE WITH COMPLICATION (MULTI): Primary | ICD-10-CM

## 2025-08-28 ENCOUNTER — APPOINTMENT (OUTPATIENT)
Dept: INFUSION THERAPY | Facility: CLINIC | Age: 30
End: 2025-08-28
Payer: COMMERCIAL

## 2025-08-28 VITALS
SYSTOLIC BLOOD PRESSURE: 109 MMHG | RESPIRATION RATE: 16 BRPM | BODY MASS INDEX: 22.22 KG/M2 | TEMPERATURE: 97.8 F | OXYGEN SATURATION: 98 % | HEART RATE: 69 BPM | WEIGHT: 121.47 LBS | DIASTOLIC BLOOD PRESSURE: 71 MMHG

## 2025-08-28 DIAGNOSIS — K50.019 CROHN'S DISEASE OF SMALL INTESTINE WITH COMPLICATION (MULTI): ICD-10-CM

## 2025-08-28 PROCEDURE — 96365 THER/PROPH/DIAG IV INF INIT: CPT | Performed by: NURSE PRACTITIONER

## 2025-08-28 RX ORDER — EPINEPHRINE 0.3 MG/.3ML
0.3 INJECTION SUBCUTANEOUS EVERY 5 MIN PRN
OUTPATIENT
Start: 2025-10-23

## 2025-08-28 RX ORDER — FAMOTIDINE 10 MG/ML
20 INJECTION, SOLUTION INTRAVENOUS ONCE AS NEEDED
OUTPATIENT
Start: 2025-10-23

## 2025-08-28 RX ORDER — ALBUTEROL SULFATE 0.83 MG/ML
3 SOLUTION RESPIRATORY (INHALATION) AS NEEDED
OUTPATIENT
Start: 2025-10-23

## 2025-08-28 RX ORDER — DIPHENHYDRAMINE HYDROCHLORIDE 50 MG/ML
50 INJECTION, SOLUTION INTRAMUSCULAR; INTRAVENOUS AS NEEDED
OUTPATIENT
Start: 2025-10-23

## 2025-08-28 ASSESSMENT — ENCOUNTER SYMPTOMS
MYALGIAS: 0
LEG SWELLING: 0
VOMITING: 0
BLOOD IN STOOL: 0
UNEXPECTED WEIGHT CHANGE: 0
EYE PROBLEMS: 0
FREQUENCY: 0
HEADACHES: 0
DYSURIA: 0
CHILLS: 0
BRUISES/BLEEDS EASILY: 0
DIARRHEA: 0
SORE THROAT: 0
NERVOUS/ANXIOUS: 1
DEPRESSION: 0
NUMBNESS: 0
DIZZINESS: 0
VOICE CHANGE: 0
TROUBLE SWALLOWING: 0
WOUND: 0
CONSTIPATION: 0
NAUSEA: 0
ABDOMINAL PAIN: 0
SHORTNESS OF BREATH: 0
APPETITE CHANGE: 0
HEMATURIA: 0
EXTREMITY WEAKNESS: 0
ARTHRALGIAS: 0
WHEEZING: 0
LIGHT-HEADEDNESS: 0
FATIGUE: 0
COUGH: 0
PALPITATIONS: 0
FEVER: 0

## 2025-10-23 ENCOUNTER — APPOINTMENT (OUTPATIENT)
Dept: INFUSION THERAPY | Facility: CLINIC | Age: 30
End: 2025-10-23
Payer: COMMERCIAL